# Patient Record
Sex: FEMALE | Race: WHITE | NOT HISPANIC OR LATINO | Employment: FULL TIME | URBAN - METROPOLITAN AREA
[De-identification: names, ages, dates, MRNs, and addresses within clinical notes are randomized per-mention and may not be internally consistent; named-entity substitution may affect disease eponyms.]

---

## 2020-09-17 ENCOUNTER — OFFICE VISIT (OUTPATIENT)
Dept: FAMILY MEDICINE CLINIC | Facility: CLINIC | Age: 40
End: 2020-09-17
Payer: COMMERCIAL

## 2020-09-17 VITALS
HEIGHT: 70 IN | DIASTOLIC BLOOD PRESSURE: 84 MMHG | HEART RATE: 80 BPM | SYSTOLIC BLOOD PRESSURE: 138 MMHG | WEIGHT: 223 LBS | RESPIRATION RATE: 18 BRPM | TEMPERATURE: 98.4 F | BODY MASS INDEX: 31.92 KG/M2 | OXYGEN SATURATION: 98 %

## 2020-09-17 DIAGNOSIS — J01.00 SUBACUTE MAXILLARY SINUSITIS: Primary | ICD-10-CM

## 2020-09-17 DIAGNOSIS — R09.82 PND (POST-NASAL DRIP): ICD-10-CM

## 2020-09-17 PROCEDURE — 3725F SCREEN DEPRESSION PERFORMED: CPT | Performed by: FAMILY MEDICINE

## 2020-09-17 PROCEDURE — 99213 OFFICE O/P EST LOW 20 MIN: CPT | Performed by: FAMILY MEDICINE

## 2020-09-17 PROCEDURE — 1036F TOBACCO NON-USER: CPT | Performed by: FAMILY MEDICINE

## 2020-09-17 RX ORDER — AMOXICILLIN 875 MG/1
875 TABLET, COATED ORAL 2 TIMES DAILY
Qty: 20 TABLET | Refills: 0 | Status: SHIPPED | OUTPATIENT
Start: 2020-09-17 | End: 2020-09-27

## 2020-09-17 RX ORDER — PREDNISONE 10 MG/1
10 TABLET ORAL 2 TIMES DAILY WITH MEALS
Qty: 8 TABLET | Refills: 0 | Status: SHIPPED | OUTPATIENT
Start: 2020-09-17 | End: 2020-09-21

## 2020-09-17 NOTE — PROGRESS NOTES
Assessment/Plan:  Sinusitis  Will treat  Dosing and all possible se of the rx medications were discussed and all questions were answered  Pt verbalized agreement and understanding of the plan of care as outlined during his OV  No problem-specific Assessment & Plan notes found for this encounter  Problem List Items Addressed This Visit     None            Subjective:      Patient ID: Jimena Lee is a 38 y o  male  Starting about 2 weeks ago with sinus congestion, and sneezing  She is prone to sinus infections ans sinusitis  She denies taking any medication for this [id number]  The following portions of the patient's history were reviewed and updated as appropriate: allergies, current medications, past family history, past medical history, past social history, past surgical history and problem list     Review of Systems   Constitutional: Negative for appetite change and fever  HENT: Positive for congestion, postnasal drip, rhinorrhea, sinus pressure, sinus pain and sneezing  Negative for trouble swallowing  Respiratory: Negative for shortness of breath  Cardiovascular: Negative for chest pain  Gastrointestinal: Negative for abdominal pain  Genitourinary: Negative for difficulty urinating  Musculoskeletal: Negative for myalgias  Neurological: Negative for dizziness  Psychiatric/Behavioral: The patient is not nervous/anxious  Objective:      Resp 18   Ht 5' 10" (1 778 m)   Wt 101 kg (223 lb)   BMI 32 00 kg/m²          Physical Exam  Vitals signs and nursing note reviewed  Constitutional:       General: He is not in acute distress  Appearance: He is well-developed  He is obese  HENT:      Head: Normocephalic  Right Ear: Tenderness present  A middle ear effusion is present  Left Ear: Tenderness present  A middle ear effusion is present  Nose:      Right Sinus: Frontal sinus tenderness present  Left Sinus: Frontal sinus tenderness present  Mouth/Throat:      Mouth: Mucous membranes are moist       Pharynx: Oropharynx is clear  Eyes:      Pupils: Pupils are equal, round, and reactive to light  Neck:      Musculoskeletal: Normal range of motion  Cardiovascular:      Rate and Rhythm: Normal rate and regular rhythm  Pulmonary:      Effort: Pulmonary effort is normal       Breath sounds: Normal breath sounds  Abdominal:      Palpations: Abdomen is soft  Musculoskeletal: Normal range of motion  Skin:     General: Skin is warm and dry  Neurological:      Mental Status: He is alert and oriented to person, place, and time  Psychiatric:         Behavior: Behavior normal          Thought Content:  Thought content normal          Judgment: Judgment normal

## 2020-10-09 ENCOUNTER — TELEPHONE (OUTPATIENT)
Dept: FAMILY MEDICINE CLINIC | Facility: MEDICAL CENTER | Age: 40
End: 2020-10-09

## 2021-01-07 ENCOUNTER — OFFICE VISIT (OUTPATIENT)
Dept: FAMILY MEDICINE CLINIC | Facility: MEDICAL CENTER | Age: 41
End: 2021-01-07
Payer: COMMERCIAL

## 2021-01-07 VITALS
BODY MASS INDEX: 35.07 KG/M2 | HEART RATE: 64 BPM | SYSTOLIC BLOOD PRESSURE: 118 MMHG | DIASTOLIC BLOOD PRESSURE: 84 MMHG | HEIGHT: 69 IN | WEIGHT: 236.8 LBS | TEMPERATURE: 98 F

## 2021-01-07 DIAGNOSIS — Z13.0 SCREENING FOR DISORDER OF BLOOD AND BLOOD-FORMING ORGANS: ICD-10-CM

## 2021-01-07 DIAGNOSIS — Z12.31 OTHER SCREENING MAMMOGRAM: ICD-10-CM

## 2021-01-07 DIAGNOSIS — Z00.00 ROUTINE ADULT HEALTH MAINTENANCE: ICD-10-CM

## 2021-01-07 DIAGNOSIS — E66.3 OVERWEIGHT: ICD-10-CM

## 2021-01-07 DIAGNOSIS — Z23 NEED FOR IMMUNIZATION AGAINST INFLUENZA: Primary | ICD-10-CM

## 2021-01-07 DIAGNOSIS — Z13.29 SCREENING FOR THYROID DISORDER: ICD-10-CM

## 2021-01-07 DIAGNOSIS — Z13.220 SCREENING FOR LIPID DISORDERS: ICD-10-CM

## 2021-01-07 PROCEDURE — 90471 IMMUNIZATION ADMIN: CPT | Performed by: FAMILY MEDICINE

## 2021-01-07 PROCEDURE — 3008F BODY MASS INDEX DOCD: CPT | Performed by: FAMILY MEDICINE

## 2021-01-07 PROCEDURE — 3725F SCREEN DEPRESSION PERFORMED: CPT | Performed by: FAMILY MEDICINE

## 2021-01-07 PROCEDURE — 99396 PREV VISIT EST AGE 40-64: CPT | Performed by: FAMILY MEDICINE

## 2021-01-07 PROCEDURE — 90686 IIV4 VACC NO PRSV 0.5 ML IM: CPT | Performed by: FAMILY MEDICINE

## 2021-01-07 PROCEDURE — 1036F TOBACCO NON-USER: CPT | Performed by: FAMILY MEDICINE

## 2021-01-08 NOTE — PROGRESS NOTES
Elan Amaya is a new patient for CPX  Partner of [employer]   She has a history of allergies  Tried Advil Cold and sinus  Doesn't llike nasal spray  She is prone to sinusitis  SH: Works as  for [employer]  Works from home  Full time  Lives with girlfriend  FH: MGM diabetes  No heart attacks or stroke  Cancer lung cancer, throat cancer  Colon cancer MGP's   Breast cancer GM  No osteoporosis   HH: Exercise not much  Admits her diet could be better  Dietary calcium not much  Caffeine 2 cups  Alcohol occ  Nonsmoker  Gyn none recently  Mammogram none  Colonoscopy none  Review of Systems   Constitutional: Negative for fatigue, fever and unexpected weight change  Respiratory: Negative for chest tightness, shortness of breath and wheezing  Cardiovascular: Negative for chest pain, palpitations and leg swelling  Skin: Negative for rash  Neurological: Negative for dizziness and headaches  Assessment  1  Health maintenance-needs cervical, breast, and colorectal cancer screening due to family history  Immunizations are up-to-date  Needs mammogram and  blood work  2  Obesity-discussed diet and exercise  BMI Counseling: Body mass index is 34 97 kg/m²  The BMI is above normal  Nutrition recommendations include consuming healthier snacks and moderation in carbohydrate intake  Exercise recommendations include vigorous aerobic physical activity for 75 minutes/week  Plan  Flu shot  Mammogram   Blood work    Gyn referral

## 2021-01-15 ENCOUNTER — LAB (OUTPATIENT)
Dept: LAB | Facility: MEDICAL CENTER | Age: 41
End: 2021-01-15
Payer: COMMERCIAL

## 2021-01-15 DIAGNOSIS — Z13.29 SCREENING FOR THYROID DISORDER: ICD-10-CM

## 2021-01-15 DIAGNOSIS — Z13.220 SCREENING FOR LIPID DISORDERS: ICD-10-CM

## 2021-01-15 DIAGNOSIS — Z13.0 SCREENING FOR DISORDER OF BLOOD AND BLOOD-FORMING ORGANS: ICD-10-CM

## 2021-01-15 LAB
25(OH)D3 SERPL-MCNC: 11.6 NG/ML (ref 30–100)
ALBUMIN SERPL BCP-MCNC: 3.7 G/DL (ref 3.5–5)
ALP SERPL-CCNC: 80 U/L (ref 46–116)
ALT SERPL W P-5'-P-CCNC: 24 U/L (ref 12–78)
ANION GAP SERPL CALCULATED.3IONS-SCNC: 3 MMOL/L (ref 4–13)
AST SERPL W P-5'-P-CCNC: 15 U/L (ref 5–45)
BASOPHILS # BLD AUTO: 0.05 THOUSANDS/ΜL (ref 0–0.1)
BASOPHILS NFR BLD AUTO: 1 % (ref 0–1)
BILIRUB SERPL-MCNC: 0.81 MG/DL (ref 0.2–1)
BUN SERPL-MCNC: 16 MG/DL (ref 5–25)
CALCIUM SERPL-MCNC: 9.1 MG/DL (ref 8.3–10.1)
CHLORIDE SERPL-SCNC: 107 MMOL/L (ref 100–108)
CHOLEST SERPL-MCNC: 168 MG/DL (ref 50–200)
CO2 SERPL-SCNC: 27 MMOL/L (ref 21–32)
CREAT SERPL-MCNC: 0.93 MG/DL (ref 0.6–1.3)
EOSINOPHIL # BLD AUTO: 0.14 THOUSAND/ΜL (ref 0–0.61)
EOSINOPHIL NFR BLD AUTO: 2 % (ref 0–6)
ERYTHROCYTE [DISTWIDTH] IN BLOOD BY AUTOMATED COUNT: 12.5 % (ref 11.6–15.1)
GFR SERPL CREATININE-BSD FRML MDRD: 77 ML/MIN/1.73SQ M
GLUCOSE P FAST SERPL-MCNC: 84 MG/DL (ref 65–99)
HCT VFR BLD AUTO: 40.1 % (ref 34.8–46.1)
HDLC SERPL-MCNC: 63 MG/DL
HGB BLD-MCNC: 13.1 G/DL (ref 11.5–15.4)
IMM GRANULOCYTES # BLD AUTO: 0.01 THOUSAND/UL (ref 0–0.2)
IMM GRANULOCYTES NFR BLD AUTO: 0 % (ref 0–2)
LDLC SERPL CALC-MCNC: 94 MG/DL (ref 0–100)
LYMPHOCYTES # BLD AUTO: 2.79 THOUSANDS/ΜL (ref 0.6–4.47)
LYMPHOCYTES NFR BLD AUTO: 40 % (ref 14–44)
MCH RBC QN AUTO: 31.7 PG (ref 26.8–34.3)
MCHC RBC AUTO-ENTMCNC: 32.7 G/DL (ref 31.4–37.4)
MCV RBC AUTO: 97 FL (ref 82–98)
MONOCYTES # BLD AUTO: 0.44 THOUSAND/ΜL (ref 0.17–1.22)
MONOCYTES NFR BLD AUTO: 6 % (ref 4–12)
NEUTROPHILS # BLD AUTO: 3.61 THOUSANDS/ΜL (ref 1.85–7.62)
NEUTS SEG NFR BLD AUTO: 51 % (ref 43–75)
NONHDLC SERPL-MCNC: 105 MG/DL
NRBC BLD AUTO-RTO: 0 /100 WBCS
PLATELET # BLD AUTO: 317 THOUSANDS/UL (ref 149–390)
PMV BLD AUTO: 10.6 FL (ref 8.9–12.7)
POTASSIUM SERPL-SCNC: 4.1 MMOL/L (ref 3.5–5.3)
PROT SERPL-MCNC: 7.1 G/DL (ref 6.4–8.2)
RBC # BLD AUTO: 4.13 MILLION/UL (ref 3.81–5.12)
SODIUM SERPL-SCNC: 137 MMOL/L (ref 136–145)
TRIGL SERPL-MCNC: 55 MG/DL
TSH SERPL DL<=0.05 MIU/L-ACNC: 3.72 UIU/ML (ref 0.36–3.74)
WBC # BLD AUTO: 7.04 THOUSAND/UL (ref 4.31–10.16)

## 2021-01-15 PROCEDURE — 85025 COMPLETE CBC W/AUTO DIFF WBC: CPT

## 2021-01-15 PROCEDURE — 82306 VITAMIN D 25 HYDROXY: CPT

## 2021-01-15 PROCEDURE — 36415 COLL VENOUS BLD VENIPUNCTURE: CPT

## 2021-01-15 PROCEDURE — 80053 COMPREHEN METABOLIC PANEL: CPT

## 2021-01-15 PROCEDURE — 80061 LIPID PANEL: CPT

## 2021-01-15 PROCEDURE — 84443 ASSAY THYROID STIM HORMONE: CPT

## 2021-01-28 DIAGNOSIS — E55.9 VITAMIN D DEFICIENCY: Primary | ICD-10-CM

## 2021-01-28 RX ORDER — ERGOCALCIFEROL 1.25 MG/1
50000 CAPSULE ORAL WEEKLY
Qty: 12 CAPSULE | Refills: 0 | Status: SHIPPED | OUTPATIENT
Start: 2021-01-28

## 2021-03-30 ENCOUNTER — OFFICE VISIT (OUTPATIENT)
Dept: OBGYN CLINIC | Facility: MEDICAL CENTER | Age: 41
End: 2021-03-30
Payer: COMMERCIAL

## 2021-03-30 ENCOUNTER — HOSPITAL ENCOUNTER (OUTPATIENT)
Dept: RADIOLOGY | Facility: MEDICAL CENTER | Age: 41
Discharge: HOME | End: 2021-03-30
Payer: COMMERCIAL

## 2021-03-30 VITALS — BODY MASS INDEX: 33.77 KG/M2 | HEIGHT: 69 IN | WEIGHT: 228 LBS

## 2021-03-30 VITALS
DIASTOLIC BLOOD PRESSURE: 86 MMHG | HEIGHT: 69 IN | WEIGHT: 228.6 LBS | BODY MASS INDEX: 33.86 KG/M2 | SYSTOLIC BLOOD PRESSURE: 136 MMHG

## 2021-03-30 DIAGNOSIS — Z12.31 VISIT FOR SCREENING MAMMOGRAM: ICD-10-CM

## 2021-03-30 DIAGNOSIS — Z12.31 OTHER SCREENING MAMMOGRAM: ICD-10-CM

## 2021-03-30 DIAGNOSIS — Z01.419 ENCOUNTER FOR GYNECOLOGICAL EXAMINATION (GENERAL) (ROUTINE) WITHOUT ABNORMAL FINDINGS: Primary | ICD-10-CM

## 2021-03-30 PROCEDURE — 87624 HPV HI-RISK TYP POOLED RSLT: CPT | Performed by: STUDENT IN AN ORGANIZED HEALTH CARE EDUCATION/TRAINING PROGRAM

## 2021-03-30 PROCEDURE — 3008F BODY MASS INDEX DOCD: CPT | Performed by: FAMILY MEDICINE

## 2021-03-30 PROCEDURE — 99386 PREV VISIT NEW AGE 40-64: CPT | Performed by: STUDENT IN AN ORGANIZED HEALTH CARE EDUCATION/TRAINING PROGRAM

## 2021-03-30 PROCEDURE — 77063 BREAST TOMOSYNTHESIS BI: CPT

## 2021-03-30 PROCEDURE — G0145 SCR C/V CYTO,THINLAYER,RESCR: HCPCS | Performed by: STUDENT IN AN ORGANIZED HEALTH CARE EDUCATION/TRAINING PROGRAM

## 2021-03-30 PROCEDURE — 77067 SCR MAMMO BI INCL CAD: CPT

## 2021-03-30 NOTE — PROGRESS NOTES
Assessment/Plan:    39 yo G0 annual exam  Doing well  Problem List Items Addressed This Visit    Visit Diagnoses     Encounter for gynecological examination (general) (routine) without abnormal findings    -  Primary    Liquid-based pap, screening  Mammo today  Discussed early colonoscopy with PCP + insurance company due to family history - insurance won't cover  Encouraged to report any change in bowel movements  Encourage healthy diet + exercise  F/uin 1 yr or PRN            Subjective:      Patient ID: Meryle Mariscal is a 38 y o  female  This is a 38 y o  G0 w/ last menstrual period of 03/15/2021 (exact date) in same sex relationship  She has regular periods of normal amount and duration  No bowel or bladder issues  She is sexually active w/o problems and declines STD testing today  Screening:  Last pap smear: Oct 2012 negative - No h/o abnormal  Last mammo: never - scheduled this afternoon    Family history: Maternal grandmother - breast cancer  Both maternal grandparents + paternal grandfather - colon cancer    Does not have regular exercise schedule and admits to not always eating a healthy diet - aware she could improve  The following portions of the patient's history were reviewed and updated as appropriate: allergies, current medications, past family history, past medical history, past social history, past surgical history and problem list     Review of Systems   Constitutional: Negative  HENT: Negative  Eyes: Negative  Respiratory: Negative  Cardiovascular: Negative  Gastrointestinal: Negative  Endocrine: Negative  Genitourinary: Negative for dyspareunia, dysuria, frequency, menstrual problem, pelvic pain, vaginal discharge and vaginal pain  Musculoskeletal: Negative  Skin: Negative  Allergic/Immunologic: Negative  Neurological: Negative  Hematological: Negative  Psychiatric/Behavioral: Negative            Objective:      /86 (BP Location: Left arm, Patient Position: Sitting, Cuff Size: Standard)   Ht 5' 9" (1 753 m)   Wt 104 kg (228 lb 9 6 oz)   LMP 03/15/2021 (Exact Date)   BMI 33 76 kg/m²          Physical Exam  Vitals signs reviewed  Exam conducted with a chaperone present  Neck:      Musculoskeletal: Normal range of motion  Cardiovascular:      Rate and Rhythm: Normal rate  Pulmonary:      Effort: Pulmonary effort is normal    Chest:      Breasts: Breasts are symmetrical          Right: No mass, nipple discharge, skin change or tenderness  Left: No mass, nipple discharge, skin change or tenderness  Abdominal:      Palpations: Abdomen is soft  Genitourinary:     Labia:         Right: No rash  Left: No rash  Vagina: Normal  No signs of injury  Cervix: Discharge present  No cervical motion tenderness or friability  Uterus: Not enlarged and not fixed  Adnexa:         Right: No mass  Left: No mass  Skin:     General: Skin is warm and dry  Neurological:      Mental Status: She is alert and oriented to person, place, and time     Psychiatric:         Behavior: Behavior normal

## 2021-03-31 LAB
HPV HR 12 DNA CVX QL NAA+PROBE: NEGATIVE
HPV16 DNA CVX QL NAA+PROBE: NEGATIVE
HPV18 DNA CVX QL NAA+PROBE: NEGATIVE

## 2021-04-08 LAB
LAB AP GYN PRIMARY INTERPRETATION: NORMAL
Lab: NORMAL

## 2021-04-13 DIAGNOSIS — Z23 ENCOUNTER FOR IMMUNIZATION: ICD-10-CM

## 2021-04-29 ENCOUNTER — IMMUNIZATIONS (OUTPATIENT)
Dept: FAMILY MEDICINE CLINIC | Facility: HOSPITAL | Age: 41
End: 2021-04-29

## 2021-04-29 DIAGNOSIS — Z23 ENCOUNTER FOR IMMUNIZATION: Primary | ICD-10-CM

## 2021-04-29 PROCEDURE — 91300 SARS-COV-2 / COVID-19 MRNA VACCINE (PFIZER-BIONTECH) 30 MCG: CPT | Performed by: INTERNAL MEDICINE

## 2021-04-29 PROCEDURE — 0001A SARS-COV-2 / COVID-19 MRNA VACCINE (PFIZER-BIONTECH) 30 MCG: CPT | Performed by: INTERNAL MEDICINE

## 2021-05-22 ENCOUNTER — IMMUNIZATIONS (OUTPATIENT)
Dept: FAMILY MEDICINE CLINIC | Facility: HOSPITAL | Age: 41
End: 2021-05-22

## 2021-05-22 DIAGNOSIS — Z23 ENCOUNTER FOR IMMUNIZATION: Primary | ICD-10-CM

## 2021-05-22 PROCEDURE — 91300 SARS-COV-2 / COVID-19 MRNA VACCINE (PFIZER-BIONTECH) 30 MCG: CPT

## 2021-05-22 PROCEDURE — 0002A SARS-COV-2 / COVID-19 MRNA VACCINE (PFIZER-BIONTECH) 30 MCG: CPT

## 2021-08-19 ENCOUNTER — OFFICE VISIT (OUTPATIENT)
Dept: URGENT CARE | Facility: MEDICAL CENTER | Age: 41
End: 2021-08-19
Payer: COMMERCIAL

## 2021-08-19 ENCOUNTER — TELEPHONE (OUTPATIENT)
Dept: FAMILY MEDICINE CLINIC | Facility: MEDICAL CENTER | Age: 41
End: 2021-08-19

## 2021-08-19 VITALS — HEIGHT: 69 IN | BODY MASS INDEX: 33.77 KG/M2 | WEIGHT: 228 LBS

## 2021-08-19 DIAGNOSIS — J02.9 ACUTE PHARYNGITIS, UNSPECIFIED ETIOLOGY: Primary | ICD-10-CM

## 2021-08-19 DIAGNOSIS — K12.1 STOMATITIS: ICD-10-CM

## 2021-08-19 LAB — S PYO AG THROAT QL: NEGATIVE

## 2021-08-19 PROCEDURE — 87070 CULTURE OTHR SPECIMN AEROBIC: CPT | Performed by: PHYSICIAN ASSISTANT

## 2021-08-19 PROCEDURE — 99213 OFFICE O/P EST LOW 20 MIN: CPT | Performed by: PHYSICIAN ASSISTANT

## 2021-08-19 PROCEDURE — 87880 STREP A ASSAY W/OPTIC: CPT | Performed by: PHYSICIAN ASSISTANT

## 2021-08-19 NOTE — PROGRESS NOTES
[facility]        NAME: Too Nash is a 42 y o  female  : 1980    MRN: [id number]  DATE: 2021  TIME: 6:56 PM    Assessment and Plan   Acute pharyngitis, unspecified etiology [J02 9]  1  Acute pharyngitis, unspecified etiology  POCT rapid strepA   2  Stomatitis  al mag oxide-diphenhydramine-lidocaine viscous (MAGIC MOUTHWASH) 1:1:1 suspension         Patient Instructions     1  Motrin as needed for mouth pain/fever  2  Use Magic Mouthwash- swish and spit every 6-8 hours as needed for comfort  3  Follow up with PCP in 3-5 days if symptoms persist       Chief Complaint     Chief Complaint   Patient presents with    Sore Throat     since tuesday, patient is vaccinated     Headache         History of Present Illness         Adeola Le is a 41-year-old female presents with a 2 day history of headache, sore throat and painful lesions on the roof of her mouth  Patient denies any chills, body aches, vomiting or diarrhea since the onset of her symptoms  She did reports increased nasal congestion and slight runny nose that started earlier today  Patient denies any known sick contacts  Review of Systems   Review of Systems   Constitutional: Negative  HENT: Positive for congestion, mouth sores and sore throat  Respiratory: Negative  Gastrointestinal: Negative            Current Medications       Current Outpatient Medications:     al mag oxide-diphenhydramine-lidocaine viscous (MAGIC MOUTHWASH) 1:1:1 suspension, Swish and spit 10 mL every 6 (six) hours as needed for mouth pain or discomfort for up to 3 days, Disp: 90 mL, Rfl: 0    ergocalciferol (VITAMIN D2) 50,000 units, Take 1 capsule (50,000 Units total) by mouth once a week (Patient not taking: Reported on 2021), Disp: 12 capsule, Rfl: 0    Current Allergies     Allergies as of 2021    (No Known Allergies)            The following portions of the patient's history were reviewed and updated as appropriate: allergies, current medications, past family history, past medical history, past social history, past surgical history and problem list      Past Medical History:   Diagnosis Date    Allergic     Anxiety disorder        History reviewed  No pertinent surgical history  Family History   Problem Relation Age of Onset    Cancer Maternal Grandmother     Diabetes Maternal Grandmother     Breast cancer Maternal Grandmother         over 48    Colon cancer Maternal Grandmother         over 48    Cancer Maternal Grandfather     Colon cancer Maternal Grandfather         over 48    Cancer Paternal Grandfather     Colon cancer Paternal Grandfather         over 48    No Known Problems Mother     No Known Problems Father     No Known Problems Sister     No Known Problems Brother     No Known Problems Maternal Aunt     No Known Problems Paternal Uncle     No Known Problems Paternal Uncle     No Known Problems Maternal Aunt     No Known Problems Maternal Aunt     Prostate cancer Maternal Uncle          Medications have been verified  Objective   Ht 5' 9" (1 753 m)   Wt 103 kg (228 lb)   BMI 33 67 kg/m²   No LMP recorded  Physical Exam     Physical Exam  Constitutional:       Appearance: She is well-developed  She is not ill-appearing  HENT:      Head: Normocephalic and atraumatic  Right Ear: Tympanic membrane and ear canal normal       Left Ear: Tympanic membrane and ear canal normal       Nose: Congestion and rhinorrhea present  Rhinorrhea is clear  Mouth/Throat:      Lips: Pink  Pharynx: Posterior oropharyngeal erythema present  Neurological:      Mental Status: She is alert

## 2021-08-19 NOTE — TELEPHONE ENCOUNTER
Pt called to request appt with Dr Ahsan Lombardo  She has had a sore throat all week and has not been able to eat for 3 days  Someone looked at her throat and said the roof of her mouth is "covered with sores and pus "  She denied fever, cough, nausea and vomiting, but does have a runny nose and had a headache earlier in the week  Routed to Clinical - covid testing?

## 2021-08-19 NOTE — PATIENT INSTRUCTIONS
1  Motrin as needed for mouth pain/fever  2  Use Magic Mouthwash- swish and spit every 6-8 hours as needed for comfort  3   Follow up with PCP in 3-5 days if symptoms persist

## 2021-08-19 NOTE — TELEPHONE ENCOUNTER
Triaged-Triaged- aware covid testing would be advised first, since she is in discomfort I suggest she go to [facility] and be assessed, they can test her there if warranted    Patient understands instructions

## 2021-08-21 LAB — BACTERIA THROAT CULT: NORMAL

## 2021-12-21 ENCOUNTER — IMMUNIZATIONS (OUTPATIENT)
Dept: FAMILY MEDICINE CLINIC | Facility: HOSPITAL | Age: 41
End: 2021-12-21

## 2021-12-21 DIAGNOSIS — Z23 ENCOUNTER FOR IMMUNIZATION: Primary | ICD-10-CM

## 2021-12-21 PROCEDURE — 0001A COVID-19 PFIZER VACC 0.3 ML: CPT | Performed by: PEDIATRICS

## 2021-12-21 PROCEDURE — 91300 COVID-19 PFIZER VACC 0.3 ML: CPT | Performed by: PEDIATRICS

## 2022-01-13 ENCOUNTER — OFFICE VISIT (OUTPATIENT)
Dept: FAMILY MEDICINE CLINIC | Facility: MEDICAL CENTER | Age: 42
End: 2022-01-13
Payer: COMMERCIAL

## 2022-01-13 VITALS
TEMPERATURE: 98.6 F | HEIGHT: 69 IN | SYSTOLIC BLOOD PRESSURE: 110 MMHG | DIASTOLIC BLOOD PRESSURE: 64 MMHG | WEIGHT: 216.8 LBS | BODY MASS INDEX: 32.11 KG/M2 | OXYGEN SATURATION: 98 % | HEART RATE: 72 BPM

## 2022-01-13 DIAGNOSIS — E66.3 OVERWEIGHT: ICD-10-CM

## 2022-01-13 DIAGNOSIS — E55.9 VITAMIN D DEFICIENCY: ICD-10-CM

## 2022-01-13 DIAGNOSIS — Z00.00 ROUTINE ADULT HEALTH MAINTENANCE: ICD-10-CM

## 2022-01-13 DIAGNOSIS — Z23 IMMUNIZATION DUE: Primary | ICD-10-CM

## 2022-01-13 DIAGNOSIS — Z12.31 ENCOUNTER FOR SCREENING MAMMOGRAM FOR MALIGNANT NEOPLASM OF BREAST: ICD-10-CM

## 2022-01-13 PROCEDURE — 99396 PREV VISIT EST AGE 40-64: CPT | Performed by: FAMILY MEDICINE

## 2022-01-13 PROCEDURE — 90686 IIV4 VACC NO PRSV 0.5 ML IM: CPT

## 2022-01-13 PROCEDURE — 3008F BODY MASS INDEX DOCD: CPT | Performed by: FAMILY MEDICINE

## 2022-01-13 PROCEDURE — 1036F TOBACCO NON-USER: CPT | Performed by: FAMILY MEDICINE

## 2022-01-13 PROCEDURE — 3725F SCREEN DEPRESSION PERFORMED: CPT | Performed by: FAMILY MEDICINE

## 2022-01-13 PROCEDURE — 90471 IMMUNIZATION ADMIN: CPT

## 2022-01-13 NOTE — PROGRESS NOTES
Brant Childs is here for CPX  HH: Trying to walk  Caffeine 1-2 daily  Alcohol occ  Nonsmoker  Dietary calcium not much  FH: unchanged  SH:  Works from home for the [employer]  Broke up with girlfriend recently    Mammogram 2021  Gyn exam 2021     She gets a stiffness in her neck scarlet when turning to the right  No pain radiation  No paresthesias  she was noted have significant vitamin-D deficiency  On blood work done last year  She did take the vitamin-D prescription for 3 months but has not always been compliant with the vitamin D3 2000 units daily  O: /64   Pulse 72   Temp 98 6 °F (37 °C)   Ht 5' 9" (1 753 m)   Wt 98 3 kg (216 lb 12 8 oz)   SpO2 98%   BMI 32 02 kg/m²     BP by me 110/64  Physical Exam  Constitutional:       Appearance: She is well-developed  HENT:      Head: Normocephalic  Right Ear: Tympanic membrane and external ear normal       Left Ear: Tympanic membrane and external ear normal       Nose: Nose normal    Eyes:      General: No scleral icterus  Conjunctiva/sclera: Conjunctivae normal       Pupils: Pupils are equal, round, and reactive to light  Neck:      Thyroid: No thyroid mass or thyromegaly  Vascular: No carotid bruit  Cardiovascular:      Rate and Rhythm: Normal rate and regular rhythm  Pulses:           Femoral pulses are 2+ on the right side and 2+ on the left side  Popliteal pulses are 2+ on the right side and 2+ on the left side  Dorsalis pedis pulses are 2+ on the right side and 2+ on the left side  Posterior tibial pulses are 2+ on the right side and 2+ on the left side  Heart sounds: Normal heart sounds  Pulmonary:      Effort: Pulmonary effort is normal  No respiratory distress  Breath sounds: Normal breath sounds  No wheezing or rales  Chest:   Breasts:      Right: No supraclavicular adenopathy  Left: No supraclavicular adenopathy         Abdominal:      General: Bowel sounds are normal  There is no distension  Palpations: Abdomen is soft  There is no mass  Tenderness: There is no abdominal tenderness  Musculoskeletal:         General: Normal range of motion  Cervical back: Normal range of motion and neck supple  Lymphadenopathy:      Head:      Right side of head: No submandibular or occipital adenopathy  Left side of head: No submandibular or occipital adenopathy  Cervical: No cervical adenopathy  Upper Body:      Right upper body: No supraclavicular adenopathy  Left upper body: No supraclavicular adenopathy  Skin:     Findings: No lesion or rash  Neurological:      Mental Status: She is oriented to person, place, and time  Psychiatric:         Behavior: Behavior normal        Assessment    1  Health maintenance- up-to-date with breast and cervical cancer screening  We discussed mammography recommendations  I also advised colonoscopy at age 38 in light of her family history  Advise calcium supplementation along with better compliance with vitamin-D and weight-bearing exercise to prevent osteoporosis  Immunizations are up-to-date including COVID vaccine with booster  Should have hepatitis-C antibody  2  Overweight-encouraged diet exercise   3  Vitamin-D deficiency -encouraged better compliance with vitamin-D 3 2000 units daily  4  Cervicalgia -mild -encouraged range of motion and massage     Plan   Mammogram   Caltrate  Vitamin-D supplementation  Recheck 1 year    Wishes to defer blood work until next year at which time she will also have hepatitis-C antibody

## 2022-04-05 ENCOUNTER — ANNUAL EXAM (OUTPATIENT)
Dept: OBGYN CLINIC | Facility: MEDICAL CENTER | Age: 42
End: 2022-04-05
Payer: COMMERCIAL

## 2022-04-05 VITALS — HEIGHT: 69 IN | BODY MASS INDEX: 32.94 KG/M2 | WEIGHT: 222.4 LBS

## 2022-04-05 DIAGNOSIS — Z01.419 ENCOUNTER FOR ANNUAL ROUTINE GYNECOLOGICAL EXAMINATION: Primary | ICD-10-CM

## 2022-04-05 DIAGNOSIS — E66.9 OBESITY (BMI 30-39.9): ICD-10-CM

## 2022-04-05 PROCEDURE — 99396 PREV VISIT EST AGE 40-64: CPT | Performed by: STUDENT IN AN ORGANIZED HEALTH CARE EDUCATION/TRAINING PROGRAM

## 2022-04-05 PROCEDURE — 1036F TOBACCO NON-USER: CPT | Performed by: STUDENT IN AN ORGANIZED HEALTH CARE EDUCATION/TRAINING PROGRAM

## 2022-04-05 PROCEDURE — 3008F BODY MASS INDEX DOCD: CPT | Performed by: STUDENT IN AN ORGANIZED HEALTH CARE EDUCATION/TRAINING PROGRAM

## 2022-04-05 NOTE — PROGRESS NOTES
Assessment/Plan:    41 yo G0 - annual exam   F/u in 1 yr or prn      Problem List Items Addressed This Visit     Encounter for annual routine gynecological examination    -  Primary  Pap due 2026    Obesity (BMI 30-39  9)      Declines nutrition referral            Subjective:      Patient ID:     This is a 42 y o  G0 with last menstrual period was 03/16/2022 (exact date)  Contraception: same sex relationship  She has regular periods of normal amount and duration  She is sexually active and declines STD testing today  She has no issues with intercourse  No bowel or bladder concerns  Screening:  Last pap smear: 3/30/21 neg/neg  Last mammo: 3/20/21 neg - scheduled for 4/12/22  Colonoscopy: previously discussed w/ insurance - not covered despite family history  Plans to try again  Family history:     Colon cancer: paternal and maternal grandfather, maternal grandmother   Breast cancer: maternal grandmother    BMI 32 84 - admits she could eat healthier and exercise more        The following portions of the patient's history were reviewed and updated as appropriate: allergies, current medications, past family history, past medical history, past social history, past surgical history and problem list     Review of Systems   Constitutional: Negative  HENT: Negative  Eyes: Negative  Respiratory: Negative  Cardiovascular: Negative  Gastrointestinal: Negative  Endocrine: Negative  Genitourinary: Negative for dyspareunia, dysuria, frequency, menstrual problem, pelvic pain, vaginal discharge and vaginal pain  Musculoskeletal: Negative  Skin: Negative  Allergic/Immunologic: Negative  Neurological: Negative  Hematological: Negative  Psychiatric/Behavioral: Negative  Objective:      Ht 5' 9" (1 753 m)   Wt 101 kg (222 lb 6 4 oz)   LMP 03/16/2022 (Exact Date)   BMI 32 84 kg/m²          Physical Exam  Vitals reviewed  Exam conducted with a chaperone present     Cardiovascular: Rate and Rhythm: Normal rate  Pulmonary:      Effort: Pulmonary effort is normal    Chest:   Breasts: Breasts are symmetrical       Right: No mass, nipple discharge, skin change or tenderness  Left: No mass, nipple discharge, skin change or tenderness  Abdominal:      Palpations: Abdomen is soft  Genitourinary:     Vagina: Normal  No signs of injury  Uterus: Not enlarged and not fixed  Adnexa:         Right: No mass  Left: No mass  Musculoskeletal:      Cervical back: Normal range of motion  Skin:     General: Skin is warm and dry  Neurological:      Mental Status: She is alert and oriented to person, place, and time     Psychiatric:         Behavior: Behavior normal  Patient

## 2022-04-12 ENCOUNTER — HOSPITAL ENCOUNTER (OUTPATIENT)
Dept: RADIOLOGY | Facility: MEDICAL CENTER | Age: 42
Discharge: HOME | End: 2022-04-12
Payer: COMMERCIAL

## 2022-04-12 VITALS — WEIGHT: 222 LBS | HEIGHT: 69 IN | BODY MASS INDEX: 32.88 KG/M2

## 2022-04-12 DIAGNOSIS — Z12.31 ENCOUNTER FOR SCREENING MAMMOGRAM FOR MALIGNANT NEOPLASM OF BREAST: ICD-10-CM

## 2022-04-12 PROCEDURE — 77063 BREAST TOMOSYNTHESIS BI: CPT

## 2022-04-12 PROCEDURE — 77067 SCR MAMMO BI INCL CAD: CPT

## 2022-12-23 ENCOUNTER — TELEPHONE (OUTPATIENT)
Dept: FAMILY MEDICINE CLINIC | Facility: MEDICAL CENTER | Age: 42
End: 2022-12-23

## 2022-12-23 NOTE — TELEPHONE ENCOUNTER
I spoke to [place], she said that she thinks she has a sinus infection and would like to possible get an antibiotic or an appt  Advised that we do not have appts left this afternoon but that she could go to [place] [id number] [facility] or [facility] as she wants to get an antibiotic before she flies tomorrow  Patient agreeable

## 2022-12-23 NOTE — TELEPHONE ENCOUNTER
Pt said she has a ST starting today  She had a ST last month that resolved  She is going to [facility]  tomorrow and want to know what to do  She would like abx  Please, triage

## 2023-01-24 ENCOUNTER — OFFICE VISIT (OUTPATIENT)
Dept: FAMILY MEDICINE CLINIC | Facility: MEDICAL CENTER | Age: 43
End: 2023-01-24

## 2023-01-24 VITALS
DIASTOLIC BLOOD PRESSURE: 84 MMHG | HEART RATE: 71 BPM | WEIGHT: 220.6 LBS | BODY MASS INDEX: 32.67 KG/M2 | HEIGHT: 69 IN | SYSTOLIC BLOOD PRESSURE: 130 MMHG | RESPIRATION RATE: 16 BRPM | TEMPERATURE: 97.3 F

## 2023-01-24 DIAGNOSIS — Z23 ENCOUNTER FOR IMMUNIZATION: ICD-10-CM

## 2023-01-24 DIAGNOSIS — Z13.1 SCREENING FOR DIABETES MELLITUS (DM): ICD-10-CM

## 2023-01-24 DIAGNOSIS — B00.1 RECURRENT COLD SORES: ICD-10-CM

## 2023-01-24 DIAGNOSIS — K59.00 CONSTIPATION, UNSPECIFIED CONSTIPATION TYPE: ICD-10-CM

## 2023-01-24 DIAGNOSIS — Z12.31 ENCOUNTER FOR SCREENING MAMMOGRAM FOR MALIGNANT NEOPLASM OF BREAST: ICD-10-CM

## 2023-01-24 DIAGNOSIS — K92.1 BLOOD IN STOOL: ICD-10-CM

## 2023-01-24 DIAGNOSIS — L70.9 ACNE, UNSPECIFIED ACNE TYPE: ICD-10-CM

## 2023-01-24 DIAGNOSIS — Z11.4 SCREENING FOR HIV (HUMAN IMMUNODEFICIENCY VIRUS): ICD-10-CM

## 2023-01-24 DIAGNOSIS — R45.89 DEPRESSED MOOD: ICD-10-CM

## 2023-01-24 DIAGNOSIS — Z11.59 NEED FOR HEPATITIS C SCREENING TEST: ICD-10-CM

## 2023-01-24 DIAGNOSIS — Z80.0 FAMILY HISTORY OF COLON CANCER: ICD-10-CM

## 2023-01-24 DIAGNOSIS — Z00.00 ANNUAL PHYSICAL EXAM: Primary | ICD-10-CM

## 2023-01-24 DIAGNOSIS — Z13.220 SCREENING CHOLESTEROL LEVEL: ICD-10-CM

## 2023-01-24 RX ORDER — VALACYCLOVIR HYDROCHLORIDE 1 G/1
2000 TABLET, FILM COATED ORAL 2 TIMES DAILY
Qty: 32 TABLET | Refills: 0 | Status: SHIPPED | OUTPATIENT
Start: 2023-01-24 | End: 2023-01-25

## 2023-01-24 NOTE — PROGRESS NOTES
[facility] HEALTH MAINTENANCE OFFICE VISIT  [facility] - [facility] [place]    NAME: Raphael Kumar  AGE: 36 y o  SEX: female  : 1980     DATE: 2023    Assessment and Plan     1  Annual physical exam    2  Encounter for immunization  -     influenza vaccine, quadrivalent, 0 5 mL, preservative-free, for adult and pediatric patients 6 mos+ (AFLURIA, FLUARIX, FLULAVAL, FLUZONE)    3  Need for hepatitis C screening test  -     Hepatitis C Antibody ([facility]); Future    4  Screening for HIV (human immunodeficiency virus)  -     HIV 1/2 AG/AB w Reflex [facility] for 2 yr old and above; Future    5  Depressed mood  -     Ambulatory Referral to [facility]; Future    6  Constipation, unspecified constipation type  -     psyllium (METAMUCIL) 58 6 % packet; Take 1 packet by mouth daily  -     TSH, 3rd generation with Free T4 reflex; Future    7  Acne, unspecified acne type  -     Ambulatory Referral to Dermatology; Future    8  Encounter for screening mammogram for malignant neoplasm of breast  -     Mammo screening bilateral w 3d & cad; Future; Expected date: 2023    9  BMI 32 0-32 9,adult  -     Lipid Panel with Direct LDL reflex; Future  -     Hemoglobin A1C; Future    10  Screening for diabetes mellitus (DM)  -     Hemoglobin A1C; Future    11  Screening cholesterol level  -     Lipid Panel with Direct LDL reflex; Future    12  Family history of colon cancer  -     Ambulatory Referral to Gastroenterology; Future    13  Recurrent cold sores  -     valACYclovir (VALTREX) 1,000 mg tablet; Take 2 tablets (2,000 mg total) by mouth 2 (two) times a day for 1 day Take 2000 mg twice a day for 1 day ASAP after symptom onset of cold sore  - If more recurrent in frequency can consider switching to suppression dosing    14   Blood in stool        - Patient does mention episodes of blood in stool, patient declined rectal exam today, will follow-up labs per orders, also advised about consultation with Gastroenterology, nonacute abdomen on exam today and vital signs stable        - Possibly related to hemorrhoid(s), patient does struggle with constipation- has bowel movement 2 or 3 times a  week, advised about measures to support regular bowel movements including adequate hydration, physical activity,  and adequate fiber intake, also advised to start Metamucil daily  -     Comprehensive metabolic panel; Future  -     CBC and differential; Future  -     Calprotectin,Fecal; Future  -     Occult Bloood,Fecal Immunochemical; Future  -     Ambulatory Referral to Gastroenterology; Future    · Patient Counseling:   · Nutrition: Stressed importance of a well balanced diet, moderation of sodium/saturated fat, caloric balance and sufficient intake of fiber  · Exercise: Stressed the importance of regular exercise with a goal of 150 minutes per week  · Dental Health: Discussed daily flossing and brushing and regular dental visits   · Alcohol Use:  Recommended moderation of alcohol intake  · Injury Prevention: Discussed Safety Belts, Safety Helmets, and Smoke Detectors  · Immunizations reviewed: Risks and Benefits discussed   · Influenza vaccine will receive today  · Tetanus booster up-to-date  · Counseled about COVID-19 bivalent vaccine  · Discussed benefits of:  Mammogram , Cervical Cancer screening and Screening labs  · 4/8/2021 Pap smear with cotesting normal cytology and negative HPV  · Prescription provided for mammogram in April  · Referral provided to gastroenterology, family history of colon cancer, also with complaint of blood in stool  ·   BMI Counseling: Body mass index is 32 58 kg/m²  Discussed with patient's BMI with her  BMI Counseling: Body mass index is 32 58 kg/m²  The BMI is above normal  Nutrition recommendations include encouraging healthy choices of fruits and vegetables  Exercise recommendations include exercising 3-5 times per week   Rationale for BMI follow-up plan is due to patient being overweight or obese  Depression Screening and Follow-up Plan: Patient was screened for depression during today's encounter  They screened negative with a PHQ-2 score of 2  Chief Complaint     Chief Complaint   Patient presents with   • Annual Exam   • Rectal Bleeding       History of Present Illness     HPI    Well Adult Physical     Patient here for a comprehensive physical exam     Mentions recurrent cold sores, especially winter  Would like a more effective treatment option  Patient also mentions blood in stool  Since April 2022, has had a number of episodes  Saw stool streaked with blood, bright red  Denies any abdominal pain  Does endorse constipation, will have a bowel movement 2-3 times per week  States this has been an issue since childhood  Offers that could hydrate better  Also can improve fiber in diet  Patient also request referral to counseling services for depressed mood  Not interested in pharmacotherapy at this point in time  Patient also requests referral to Dermatology as patient states gets "breakouts on face and back"   Also mentions a few episodes of urinary incontinence  No dysuria  No fever, no chills  Advised about Kegel exercises and to monitor  Declines UA      Diet and Physical Activity  Diet: could improve with fruits and vegetables   Exercise: couple times a week walks dog      Depression Screen  PHQ-2/9 Depression Screening    Little interest or pleasure in doing things: 1 - several days  Feeling down, depressed, or hopeless: 1 - several days  PHQ-2 Score: 2  PHQ-2 Interpretation: Negative depression screen          General Health  Hearing: Normal:  bilateral  Vision: goes for regular eye exams and wears glasses and contacts  Dental: regular dental visits, brushes teeth twice daily and flosses teeth occasionally    Reproductive Health  Established with gynecology for well woman care      The following portions of the patient's history were reviewed and updated as appropriate: allergies, current medications, past family history, past medical history, past social history, past surgical history and problem list     Review of Systems     Review of Systems     As noted in HPI     Past Medical History     Past Medical History:   Diagnosis Date   • Allergic    • Anxiety disorder        Past Surgical History     History reviewed  No pertinent surgical history  Social History     Social History     Socioeconomic History   • Marital status: Single     Spouse name: None   • Number of children: None   • Years of education: None   • Highest education level: None   Occupational History   • None   Tobacco Use   • Smoking status: Never   • Smokeless tobacco: Never   Vaping Use   • Vaping Use: Never used   Substance and Sexual Activity   • Alcohol use:  Yes     Alcohol/week: 0 0 standard drinks     Comment: rare use   • Drug use: Not Currently   • Sexual activity: Yes     Partners: Female     Birth control/protection: None   Other Topics Concern   • None   Social History Narrative    · Most recent tobacco use screenin2018      Social Determinants of Health     Financial Resource Strain: Not on file   Food Insecurity: Not on file   Transportation Needs: Not on file   Physical Activity: Not on file   Stress: Not on file   Social Connections: Not on file   Intimate Partner Violence: Not on file   Housing Stability: Not on file       Family History     Family History   Problem Relation Age of Onset   • Cancer Maternal Grandmother    • Diabetes Maternal Grandmother    • Breast cancer Maternal Grandmother         over 48   • Colon cancer Maternal Grandmother         over 48   • Cancer Maternal Grandfather    • Colon cancer Maternal Grandfather         over 48   • Cancer Paternal Grandfather    • Colon cancer Paternal Grandfather         over 48   • No Known Problems Mother    • No Known Problems Father    • No Known Problems Sister    • No Known Problems Brother    • No Known Problems Maternal Aunt    • No Known Problems Paternal Uncle    • No Known Problems Paternal Uncle    • No Known Problems Maternal Aunt    • No Known Problems Maternal Aunt    • Prostate cancer Maternal Uncle    • Ovarian cancer Neg Hx    • Cervical cancer Neg Hx        Current Medications       Current Outpatient Medications:   •  psyllium (METAMUCIL) 58 6 % packet, Take 1 packet by mouth daily, Disp: 75 each, Rfl: 0  •  valACYclovir (VALTREX) 1,000 mg tablet, Take 2 tablets (2,000 mg total) by mouth 2 (two) times a day for 1 day Take 2000 mg twice a day for 1 day ASAP after symptom onset of cold sore, Disp: 32 tablet, Rfl: 0     Allergies     No Known Allergies    Objective     /84 (Cuff Size: Large)   Pulse 71   Temp (!) 97 3 °F (36 3 °C)   Resp 16   Ht 5' 9" (1 753 m)   Wt 100 kg (220 lb 9 6 oz)   BMI 32 58 kg/m²      Physical Exam  Vitals reviewed  Constitutional:       General: She is not in acute distress  Appearance: She is obese  She is not ill-appearing or toxic-appearing  HENT:      Head: Normocephalic and atraumatic  Right Ear: External ear normal       Left Ear: External ear normal       Nose: Nose normal       Mouth/Throat:      Mouth: Mucous membranes are moist       Pharynx: Oropharynx is clear  Eyes:      Extraocular Movements: Extraocular movements intact  Conjunctiva/sclera: Conjunctivae normal       Pupils: Pupils are equal, round, and reactive to light  Neck:      Thyroid: No thyroid tenderness  Cardiovascular:      Rate and Rhythm: Normal rate and regular rhythm  Pulses: Normal pulses  Heart sounds: Normal heart sounds  Pulmonary:      Effort: Pulmonary effort is normal       Breath sounds: Normal breath sounds  Abdominal:      General: Abdomen is flat  Bowel sounds are normal       Palpations: Abdomen is soft  Tenderness: There is no abdominal tenderness  There is no guarding or rebound  Musculoskeletal:      Cervical back: Neck supple  Right lower leg: No edema  Left lower leg: No edema  Skin:     General: Skin is warm and dry  Capillary Refill: Capillary refill takes less than 2 seconds  Neurological:      Mental Status: She is alert and oriented to person, place, and time  Psychiatric:         Mood and Affect: Affect is flat  Behavior: Behavior normal          Thought Content:  Thought content normal          Judgment: Judgment normal                  Daniel Angeles DO  [facility] [place]

## 2023-01-24 NOTE — PATIENT INSTRUCTIONS
Kegel Exercises for Women   AMBULATORY CARE:   Kegel exercises  help strengthen your pelvic muscles  Pelvic muscles hold your pelvic organs, such as your bladder and uterus, in place  Kegel exercises help prevent or control problems with urine incontinence (leakage)  Incontinence may be caused by pregnancy, childbirth, or menopause  Contact your healthcare provider if:   · You cannot feel your muscles tighten or relax  · You continue to leak urine  · You have questions or concerns about your condition or care  Use the correct muscles:  Pelvic muscles are the muscles you use to control urine flow  To target these muscles, stop and start the flow of urine several times  This will help you become familiar with how it feels to tighten and relax these muscles  How to do Kegel exercises:   · Empty your bladder  You may lie down, stand up, or sit down to do these exercises  When you first try to do these exercises, it may be easier if you lie down  Tighten or squeeze your pelvic muscles slowly  It may feel like you are trying to hold back urine or gas  Hold this position for 3 seconds  Relax for 3 seconds  Repeat this cycle 10 times  · Do 10 sets of Kegel exercises, at least 3 times a day  Do not hold your breath when you do Kegel exercises  Keep your stomach, back, and leg muscles relaxed  · As your muscles get stronger, you will be able to hold the squeeze longer  Your healthcare provider may ask that you increase your pelvic muscle squeeze to 10 seconds  After you squeeze for 10 seconds, relax for 10 seconds  What else you should know:   · Once you know how to do Kegel exercises, use different positions  You can do these exercises while you lie on the floor, sit at your desk or watch TV, and while you stand  · You may notice improved bladder control within about 6 weeks  · Tighten your pelvic muscles before you sneeze, cough, or lift to prevent urine leakage      Follow up with your doctor as directed:  Write down your questions so you remember to ask them during your visits  © Copyright [employer] 2022 Information is for End User's use only and may not be sold, redistributed or otherwise used for commercial purposes  All illustrations and images included in CareNotes® are the copyrighted property of A [employer] A M [employer] Inc  or [employer]  The above information is an  only  It is not intended as medical advice for individual conditions or treatments  Talk to your doctor, nurse or pharmacist before following any medical regimen to see if it is safe and effective for you  Wellness Visit for Adults   AMBULATORY CARE:   A wellness visit  is when you see your healthcare provider to get screened for health problems  Your healthcare provider will also give you advice on how to stay healthy  Write down your questions so you remember to ask them  Ask your healthcare provider how often you should have a wellness visit  What happens at a wellness visit:  Your healthcare provider will ask about your health, and your family history of health problems  This includes high blood pressure, heart disease, and cancer  He or she will ask if you have symptoms that concern you, if you smoke, and about your mood  You may also be asked about your intake of medicines, supplements, food, and alcohol  Any of the following may be done:  · Your weight  will be checked  Your height may also be checked so your body mass index (BMI) can be calculated  Your BMI shows if you are at a healthy weight  · Your blood pressure  and heart rate will be checked  Your temperature may also be checked  · Blood and urine tests  may be done  Blood tests may be done to check your cholesterol levels  Abnormal cholesterol levels increase your risk for heart disease and stroke  You may also need a blood or urine test to check for diabetes if you are at increased risk   Urine tests may be done to look for signs of an infection or kidney disease  · A physical exam  includes checking your heartbeat and lungs with a stethoscope  Your healthcare provider may also check your skin to look for sun damage  · Screening tests  may be recommended  A screening test is done to check for diseases that may not cause symptoms  The screening tests you may need depend on your age, gender, family history, and lifestyle habits  For example, colorectal screening may be recommended if you are 48years old or older  Screening tests you need if you are a woman:   · A Pap smear  is used to screen for cervical cancer  Pap smears are usually done every 3 to 5 years depending on your age  You may need them more often if you have had abnormal Pap smear test results in the past  Ask your healthcare provider how often you should have a Pap smear  · A mammogram  is an x-ray of your breasts to screen for breast cancer  Experts recommend mammograms every 2 years starting at age 48 years  You may need a mammogram at age 52 years or younger if you have an increased risk for breast cancer  Talk to your healthcare provider about when you should start having mammograms and how often you need them  Vaccines you may need:   · Get an influenza vaccine  every year  The influenza vaccine protects you from the flu  Several types of viruses cause the flu  The viruses change over time, so new vaccines are made each year  · Get a tetanus-diphtheria (Td) booster vaccine  every 10 years  This vaccine protects you against tetanus and diphtheria  Tetanus is a severe infection that may cause painful muscle spasms and lockjaw  Diphtheria is a severe bacterial infection that causes a thick covering in the back of your mouth and throat  · Get a human papillomavirus (HPV) vaccine  if you are female and aged 23 to 32 or male 23 to 24 and never received it  This vaccine protects you from HPV infection  HPV is the most common infection spread by sexual contact   HPV may also cause vaginal, penile, and anal cancers  · Get a pneumococcal vaccine  if you are aged 72 years or older  The pneumococcal vaccine is an injection given to protect you from pneumococcal disease  Pneumococcal disease is an infection caused by pneumococcal bacteria  The infection may cause pneumonia, meningitis, or an ear infection  · Get a shingles vaccine  if you are 60 or older, even if you have had shingles before  The shingles vaccine is an injection to protect you from the varicella-zoster virus  This is the same virus that causes chickenpox  Shingles is a painful rash that develops in people who had chickenpox or have been exposed to the virus  How to eat healthy:  My Plate is a model for planning healthy meals  It shows the types and amounts of foods that should go on your plate  Fruits and vegetables make up about half of your plate, and grains and protein make up the other half  A serving of dairy is included on the side of your plate  The amount of calories and serving sizes you need depends on your age, gender, weight, and height  Examples of healthy foods are listed below:  · Eat a variety of vegetables  such as dark green, red, and orange vegetables  You can also include canned vegetables low in sodium (salt) and frozen vegetables without added butter or sauces  · Eat a variety of fresh fruits , canned fruit in 100% juice, frozen fruit, and dried fruit  · Include whole grains  At least half of the grains you eat should be whole grains  Examples include whole-wheat bread, wheat pasta, brown rice, and whole-grain cereals such as oatmeal     · Eat a variety of protein foods such as seafood (fish and shellfish), lean meat, and poultry without skin (turkey and chicken)  Examples of lean meats include pork leg, shoulder, or tenderloin, and beef round, sirloin, tenderloin, and extra lean ground beef   Other protein foods include eggs and egg substitutes, beans, peas, soy products, nuts, and seeds  · Choose low-fat dairy products such as skim or 1% milk or low-fat yogurt, cheese, and cottage cheese  · Limit unhealthy fats  such as butter, hard margarine, and shortening  Exercise:  Exercise at least 30 minutes per day on most days of the week  Some examples of exercise include walking, biking, dancing, and swimming  You can also fit in more physical activity by taking the stairs instead of the elevator or parking farther away from stores  Include muscle strengthening activities 2 days each week  Regular exercise provides many health benefits  It helps you manage your weight, and decreases your risk for type 2 diabetes, heart disease, stroke, and high blood pressure  Exercise can also help improve your mood  Ask your healthcare provider about the best exercise plan for you  General health and safety guidelines:   · Do not smoke  Nicotine and other chemicals in cigarettes and cigars can cause lung damage  Ask your healthcare provider for information if you currently smoke and need help to quit  E-cigarettes or smokeless tobacco still contain nicotine  Talk to your healthcare provider before you use these products  · Limit alcohol  A drink of alcohol is 12 ounces of beer, 5 ounces of wine, or 1½ ounces of liquor  · Lose weight, if needed  Being overweight increases your risk of certain health conditions  These include heart disease, high blood pressure, type 2 diabetes, and certain types of cancer  · Protect your skin  Do not sunbathe or use tanning beds  Use sunscreen with a SPF 15 or higher  Apply sunscreen at least 15 minutes before you go outside  Reapply sunscreen every 2 hours  Wear protective clothing, hats, and sunglasses when you are outside  · Drive safely  Always wear your seatbelt  Make sure everyone in your car wears a seatbelt  A seatbelt can save your life if you are in an accident  Do not use your cell phone when you are driving   This could distract you and cause an accident  Pull over if you need to make a call or send a text message  · Practice safe sex  Use latex condoms if are sexually active and have more than one partner  Your healthcare provider may recommend screening tests for sexually transmitted infections (STIs)  · Wear helmets, lifejackets, and protective gear  Always wear a helmet when you ride a bike or motorcycle, go skiing, or play sports that could cause a head injury  Wear protective equipment when you play sports  Wear a lifejacket when you are on a boat or doing water sports  © Copyright [employer] 2022 Information is for End User's use only and may not be sold, redistributed or otherwise used for commercial purposes  All illustrations and images included in CareNotes® are the copyrighted property of [employer]  or [employer]  The above information is an  only  It is not intended as medical advice for individual conditions or treatments  Talk to your doctor, nurse or pharmacist before following any medical regimen to see if it is safe and effective for you

## 2023-01-25 ENCOUNTER — TELEPHONE (OUTPATIENT)
Dept: PSYCHIATRY | Facility: CLINIC | Age: 43
End: 2023-01-25

## 2023-01-25 NOTE — TELEPHONE ENCOUNTER
Called patient regarding referral  Patient is interested in therapy  Prefers [employer] and a female provider

## 2023-01-28 ENCOUNTER — APPOINTMENT (OUTPATIENT)
Dept: LAB | Facility: MEDICAL CENTER | Age: 43
End: 2023-01-28

## 2023-01-28 DIAGNOSIS — K92.1 BLOOD IN STOOL: ICD-10-CM

## 2023-01-28 DIAGNOSIS — K59.00 CONSTIPATION, UNSPECIFIED CONSTIPATION TYPE: ICD-10-CM

## 2023-01-28 DIAGNOSIS — Z13.1 SCREENING FOR DIABETES MELLITUS (DM): ICD-10-CM

## 2023-01-28 DIAGNOSIS — Z11.59 NEED FOR HEPATITIS C SCREENING TEST: ICD-10-CM

## 2023-01-28 DIAGNOSIS — Z11.4 SCREENING FOR HIV (HUMAN IMMUNODEFICIENCY VIRUS): ICD-10-CM

## 2023-01-28 DIAGNOSIS — Z13.220 SCREENING CHOLESTEROL LEVEL: ICD-10-CM

## 2023-01-28 LAB
ALBUMIN SERPL BCP-MCNC: 3.6 G/DL (ref 3.5–5)
ALP SERPL-CCNC: 66 U/L (ref 46–116)
ALT SERPL W P-5'-P-CCNC: 27 U/L (ref 12–78)
ANION GAP SERPL CALCULATED.3IONS-SCNC: 5 MMOL/L (ref 4–13)
AST SERPL W P-5'-P-CCNC: 18 U/L (ref 5–45)
BASOPHILS # BLD AUTO: 0.04 THOUSANDS/ÂΜL (ref 0–0.1)
BASOPHILS NFR BLD AUTO: 1 % (ref 0–1)
BILIRUB SERPL-MCNC: 0.66 MG/DL (ref 0.2–1)
BUN SERPL-MCNC: 16 MG/DL (ref 5–25)
CALCIUM SERPL-MCNC: 9.4 MG/DL (ref 8.3–10.1)
CHLORIDE SERPL-SCNC: 107 MMOL/L (ref 96–108)
CHOLEST SERPL-MCNC: 174 MG/DL
CO2 SERPL-SCNC: 27 MMOL/L (ref 21–32)
CREAT SERPL-MCNC: 0.86 MG/DL (ref 0.6–1.3)
EOSINOPHIL # BLD AUTO: 0.05 THOUSAND/ÂΜL (ref 0–0.61)
EOSINOPHIL NFR BLD AUTO: 1 % (ref 0–6)
ERYTHROCYTE [DISTWIDTH] IN BLOOD BY AUTOMATED COUNT: 13.1 % (ref 11.6–15.1)
EST. AVERAGE GLUCOSE BLD GHB EST-MCNC: 94 MG/DL
GFR SERPL CREATININE-BSD FRML MDRD: 83 ML/MIN/1.73SQ M
GLUCOSE P FAST SERPL-MCNC: 86 MG/DL (ref 65–99)
HBA1C MFR BLD: 4.9 %
HCT VFR BLD AUTO: 41.7 % (ref 34.8–46.1)
HCV AB SER QL: NORMAL
HDLC SERPL-MCNC: 68 MG/DL
HGB BLD-MCNC: 13.2 G/DL (ref 11.5–15.4)
HIV 1+2 AB+HIV1 P24 AG SERPL QL IA: NORMAL
HIV 2 AB SERPL QL IA: NORMAL
HIV1 AB SERPL QL IA: NORMAL
HIV1 P24 AG SERPL QL IA: NORMAL
IMM GRANULOCYTES # BLD AUTO: 0.01 THOUSAND/UL (ref 0–0.2)
IMM GRANULOCYTES NFR BLD AUTO: 0 % (ref 0–2)
LDLC SERPL CALC-MCNC: 94 MG/DL (ref 0–100)
LYMPHOCYTES # BLD AUTO: 2.04 THOUSANDS/ÂΜL (ref 0.6–4.47)
LYMPHOCYTES NFR BLD AUTO: 36 % (ref 14–44)
MCH RBC QN AUTO: 30.8 PG (ref 26.8–34.3)
MCHC RBC AUTO-ENTMCNC: 31.7 G/DL (ref 31.4–37.4)
MCV RBC AUTO: 97 FL (ref 82–98)
MONOCYTES # BLD AUTO: 0.37 THOUSAND/ÂΜL (ref 0.17–1.22)
MONOCYTES NFR BLD AUTO: 7 % (ref 4–12)
NEUTROPHILS # BLD AUTO: 3.21 THOUSANDS/ÂΜL (ref 1.85–7.62)
NEUTS SEG NFR BLD AUTO: 55 % (ref 43–75)
NRBC BLD AUTO-RTO: 0 /100 WBCS
PLATELET # BLD AUTO: 282 THOUSANDS/UL (ref 149–390)
PMV BLD AUTO: 11.3 FL (ref 8.9–12.7)
POTASSIUM SERPL-SCNC: 4.4 MMOL/L (ref 3.5–5.3)
PROT SERPL-MCNC: 6.9 G/DL (ref 6.4–8.4)
RBC # BLD AUTO: 4.29 MILLION/UL (ref 3.81–5.12)
SODIUM SERPL-SCNC: 139 MMOL/L (ref 135–147)
TRIGL SERPL-MCNC: 59 MG/DL
TSH SERPL DL<=0.05 MIU/L-ACNC: 4.5 UIU/ML (ref 0.45–4.5)
WBC # BLD AUTO: 5.72 THOUSAND/UL (ref 4.31–10.16)

## 2023-02-04 ENCOUNTER — APPOINTMENT (OUTPATIENT)
Dept: LAB | Facility: MEDICAL CENTER | Age: 43
End: 2023-02-04

## 2023-02-04 DIAGNOSIS — K92.1 BLOOD IN STOOL: ICD-10-CM

## 2023-02-04 LAB — HEMOCCULT STL QL IA: NEGATIVE

## 2023-02-08 LAB — CALPROTECTIN STL-MCNT: 33 UG/G (ref 0–120)

## 2023-05-11 ENCOUNTER — ANNUAL EXAM (OUTPATIENT)
Dept: OBGYN CLINIC | Facility: MEDICAL CENTER | Age: 43
End: 2023-05-11

## 2023-05-11 VITALS
WEIGHT: 220 LBS | DIASTOLIC BLOOD PRESSURE: 80 MMHG | HEIGHT: 69 IN | BODY MASS INDEX: 32.58 KG/M2 | SYSTOLIC BLOOD PRESSURE: 130 MMHG

## 2023-05-11 DIAGNOSIS — Z01.419 ENCOUNTER FOR ANNUAL ROUTINE GYNECOLOGICAL EXAMINATION: Primary | ICD-10-CM

## 2023-05-11 DIAGNOSIS — Z12.31 ENCOUNTER FOR SCREENING MAMMOGRAM FOR MALIGNANT NEOPLASM OF BREAST: ICD-10-CM

## 2023-05-11 NOTE — PROGRESS NOTES
Subjective:        Malaika Blackwood is a 36 y o  female  Here for Gynecologic Exam      GYN HPI  Here for annual gyn exam  Menstrual cycle:  Patient denies menstrual complaints  Regular monthly menses, not excessive  Vaginal c/o: denies  Urinary c/o: denies  Breast complaints:denies  She does do self breast Exams    Sexually active: yes  Denies problems r/t intimacy/SA  Contraception: n/a- female partner  She reports she feels safe at home  The following portions of the patient's history were reviewed and updated as appropriate: allergies, current medications, past family history, past medical history, past social history, past surgical history, and problem list     Hereditary Cancer Screening  Cancer-related family history includes Breast cancer in her maternal grandmother; Cancer in her maternal grandfather, maternal grandmother, and paternal grandfather; Colon cancer in her maternal grandfather, maternal grandmother, and paternal grandfather; Prostate cancer in her maternal uncle  There is no history of Ovarian cancer or Cervical cancer  Substance Abuse Screening Completed  See hx and flowsheet  Screens Positive for none     HEALTH MAINTENANCE SCREENINGS:    History of abnormal pap: No, Last Papanicolaou test:  03/30/2021  History of abnormal mammogram: No, Last mammogram:  04/14/2023        Review of Systems   Constitutional: Negative for appetite change, chills, fatigue, fever and unexpected weight change  HENT: Negative  Eyes: Negative  Respiratory: Negative for chest tightness and shortness of breath  Cardiovascular: Negative for chest pain and palpitations  Gastrointestinal: Negative for abdominal pain, constipation and vomiting  Endocrine: Negative for cold intolerance and heat intolerance  Genitourinary:        As per HPI   Musculoskeletal: Negative for back pain, joint swelling and neck pain  Skin: Negative for color change and rash     Neurological: Negative for dizziness, "weakness and numbness  Hematological: Does not bruise/bleed easily  Psychiatric/Behavioral: Negative  Objective:  /80 (BP Location: Left arm, Patient Position: Sitting, Cuff Size: Large)   Ht 5' 9\" (1 753 m)   Wt 99 8 kg (220 lb)   LMP 04/24/2023 (Approximate)   BMI 32 49 kg/m²        Physical Exam  Constitutional:       General: She is not in acute distress  Appearance: Normal appearance  Genitourinary:      Vulva and rectum normal       No lesions in the vagina  Right Labia: No rash or lesions  Left Labia: No lesions or rash  No vaginal discharge, erythema, tenderness or bleeding  Right Adnexa: not tender and no mass present  Left Adnexa: not tender and no mass present  No cervical motion tenderness, discharge or friability  Uterus is not enlarged or tender  No urethral prolapse present  Pelvic exam was performed with patient in the lithotomy position  Breasts:     Breasts are symmetrical       Right: No inverted nipple, mass, nipple discharge, skin change or tenderness  Left: No inverted nipple, mass, nipple discharge, skin change or tenderness  HENT:      Head: Normocephalic and atraumatic  Cardiovascular:      Rate and Rhythm: Normal rate  Heart sounds: No murmur heard  Pulmonary:      Effort: Pulmonary effort is normal       Breath sounds: Normal breath sounds  Abdominal:      General: There is no distension  Palpations: Abdomen is soft  Tenderness: There is no abdominal tenderness  Musculoskeletal:         General: Normal range of motion  Cervical back: Normal range of motion  Lymphadenopathy:      Cervical: No cervical adenopathy  Neurological:      Mental Status: She is alert and oriented to person, place, and time  Skin:     General: Skin is warm and dry  Psychiatric:         Mood and Affect: Mood normal          Behavior: Behavior normal    Vitals reviewed           " Assessment/Plan:           Roma Buckley- Primary  Annual GYN examination completed today  Risk prevention and anticipatory guidance provided including:  • Risk for hereditary cancers: Family history reviewed and patient does qualify for referral for genetics consult/testing  Referral to genetics oncology offered as indicated  Declined referral  • Calcium and vitamin D supplementation  • Dietary and lifestyle recommendations based on her age and weight  body mass index is 32 49 kg/m²       • Tobacco and alcohol use, intervention ordered if applicable  Cervical cancer screening  Previous pap smears and ASCCP screening guidelines have been reviewed  Pap smear is not indicated at this time  Contraception   n/a- female partner    STI Screening  STI screening is declined  STI prevention discussed with use of condoms  Breast exam and breast cancer screening  Breast exam was done; , Reviewed recommendation for yearly screening mammogram (as per ACOG, ACS, and [address] [place] [address] Departments), however, also made her aware that there are other professional organizations that may recommend deferring mammograms until age 48 or screening every other year  CBE should still be done yearly, but may miss some cancers and alone is not as effective as breast imaging  Supplemental testing may also be indicated based on lifetime risk for breast cancer as done by [facility] imaging , She is up to date with screening;  Next due 4/2024    Problem List Items Addressed This Visit    None  Visit Diagnoses     Encounter for annual routine gynecological examination    -  Primary    Encounter for screening mammogram for malignant neoplasm of breast        Relevant Orders    Mammo screening bilateral w 3d & cad          Orders Placed This Encounter   Procedures   • Mammo screening bilateral w 3d & cad

## 2023-05-11 NOTE — PROGRESS NOTES
LMP: 2023  PMB:  SA:  YES  HPV:  Birth control:  Last pap: 2021  Negative HPV Negative   Last mammo: 2023:  Last colonoscopy: Not on file  Last Dexa: Not on file  Family History:    MGM- breast cancer & colon cancer   (D)   MGF- colon cancer   (D)   PGF- colon cancer   (D)

## 2023-05-24 ENCOUNTER — TELEPHONE (OUTPATIENT)
Dept: PSYCHIATRY | Facility: CLINIC | Age: 43
End: 2023-05-24

## 2023-10-05 ENCOUNTER — TELEPHONE (OUTPATIENT)
Age: 43
End: 2023-10-05

## 2023-10-05 DIAGNOSIS — R39.9 UTI SYMPTOMS: Primary | ICD-10-CM

## 2023-10-05 RX ORDER — SULFAMETHOXAZOLE AND TRIMETHOPRIM 800; 160 MG/1; MG/1
1 TABLET ORAL 2 TIMES DAILY
Qty: 6 TABLET | Refills: 0 | Status: SHIPPED | OUTPATIENT
Start: 2023-10-05 | End: 2023-10-08

## 2023-10-05 NOTE — TELEPHONE ENCOUNTER
Patient calling stating she has frequent urination and mild discomfort. She went to the pharmacy and did at home UTI test and it came back positive. I tried to offer apt but [facility] is booked today and tomorrow. Can we call something in for patient?   Please call patient back at [phone]    Thank you

## 2023-10-05 NOTE — TELEPHONE ENCOUNTER
Order placed for UA. Bactrim sent to pharmacy, advised taking concurrently with probiotic. Maintain adequate hydration. Seek medical attention if new or worsening symptoms. We will follow-up urine results.

## 2023-10-06 ENCOUNTER — APPOINTMENT (OUTPATIENT)
Dept: LAB | Facility: MEDICAL CENTER | Age: 43
End: 2023-10-06
Payer: COMMERCIAL

## 2023-10-06 DIAGNOSIS — R39.9 UTI SYMPTOMS: ICD-10-CM

## 2023-10-06 LAB
BACTERIA UR QL AUTO: ABNORMAL /HPF
BILIRUB UR QL STRIP: NEGATIVE
CAOX CRY URNS QL MICRO: ABNORMAL /HPF
CLARITY UR: CLEAR
COLOR UR: ABNORMAL
GLUCOSE UR STRIP-MCNC: NEGATIVE MG/DL
HGB UR QL STRIP.AUTO: NEGATIVE
KETONES UR STRIP-MCNC: NEGATIVE MG/DL
LEUKOCYTE ESTERASE UR QL STRIP: NEGATIVE
MUCOUS THREADS UR QL AUTO: ABNORMAL
NITRITE UR QL STRIP: NEGATIVE
NON-SQ EPI CELLS URNS QL MICRO: ABNORMAL /HPF
PH UR STRIP.AUTO: 5.5 [PH]
PROT UR STRIP-MCNC: ABNORMAL MG/DL
RBC #/AREA URNS AUTO: ABNORMAL /HPF
SP GR UR STRIP.AUTO: 1.02 (ref 1–1.03)
UROBILINOGEN UR STRIP-ACNC: <2 MG/DL
WBC #/AREA URNS AUTO: ABNORMAL /HPF

## 2023-10-06 PROCEDURE — 81001 URINALYSIS AUTO W/SCOPE: CPT

## 2023-12-04 DIAGNOSIS — B00.1 RECURRENT COLD SORES: ICD-10-CM

## 2023-12-04 RX ORDER — VALACYCLOVIR HYDROCHLORIDE 1 G/1
2000 TABLET, FILM COATED ORAL 2 TIMES DAILY
Qty: 32 TABLET | Refills: 1 | Status: SHIPPED | OUTPATIENT
Start: 2023-12-04 | End: 2023-12-05

## 2024-01-31 ENCOUNTER — OFFICE VISIT (OUTPATIENT)
Dept: FAMILY MEDICINE CLINIC | Facility: MEDICAL CENTER | Age: 44
End: 2024-01-31
Payer: COMMERCIAL

## 2024-01-31 VITALS
TEMPERATURE: 97.9 F | WEIGHT: 227 LBS | HEIGHT: 69 IN | HEART RATE: 74 BPM | BODY MASS INDEX: 33.62 KG/M2 | OXYGEN SATURATION: 98 % | SYSTOLIC BLOOD PRESSURE: 142 MMHG | DIASTOLIC BLOOD PRESSURE: 90 MMHG

## 2024-01-31 DIAGNOSIS — Z12.11 SCREEN FOR COLON CANCER: ICD-10-CM

## 2024-01-31 DIAGNOSIS — Z23 ENCOUNTER FOR IMMUNIZATION: ICD-10-CM

## 2024-01-31 DIAGNOSIS — R03.0 ELEVATED BLOOD PRESSURE READING: ICD-10-CM

## 2024-01-31 DIAGNOSIS — Z00.00 ANNUAL PHYSICAL EXAM: Primary | ICD-10-CM

## 2024-01-31 PROCEDURE — 99396 PREV VISIT EST AGE 40-64: CPT | Performed by: STUDENT IN AN ORGANIZED HEALTH CARE EDUCATION/TRAINING PROGRAM

## 2024-01-31 PROCEDURE — 90686 IIV4 VACC NO PRSV 0.5 ML IM: CPT

## 2024-01-31 PROCEDURE — 90471 IMMUNIZATION ADMIN: CPT

## 2024-01-31 NOTE — PROGRESS NOTES
[facility] HEALTH MAINTENANCE OFFICE VISIT  [facility] - [facility] [place]    NAME: Manisha Askew  AGE: 43 y.o. SEX: female  : 1980     DATE: 2024    Assessment and Plan     1. Annual physical exam    2. Encounter for immunization  -     influenza vaccine, quadrivalent, 0.5 mL, preservative-free, for adult and pediatric patients 6 mos+ (AFLURIA, FLUARIX, FLULAVAL, FLUZONE)    3. Screen for colon cancer  -     Ambulatory Referral to Gastroenterology; Future    4. Elevated blood pressure reading  Assessment & Plan:  Return to office in 2 weeks for nurse visit for blood pressure recheck          Patient Counseling:   Nutrition: Stressed importance of a well balanced diet, moderation of sodium/saturated fat, caloric balance and sufficient intake of fiber  Exercise: Stressed the importance of regular exercise with a goal of 150 minutes per week  Dental Health: Discussed daily flossing and brushing and regular dental visits   Injury Prevention: Discussed Safety Belts, Safety Helmets, and Smoke Detectors  No tobacco use and no alcohol use  Uses sunscreen appropriately  Immunizations reviewed:   Counseled about influenza vaccine, agreeable to receiving today  Informed about newest COVID-19 vaccine  Tetanus booster up-to-date  Discussed benefits of:    Has gynecology visit scheduled with gynecologist on 2024  3/30/2021 Pap smear with cotesting cytology and negative HPV  2023 fit test negative  Patient will be turning 44 years old in 2024, referral provided to gastroenterology, agreeable for colonoscopy, family history of colon cancer in maternal grandparents     BMI Counseling: Body mass index is 33.52 kg/m².     Return to office in 2 weeks for nurse visit for blood pressure recheck and in 1 year for next physical and more often as needed.  Chief Complaint     Chief Complaint   Patient presents with    Annual Exam       History of Present Illness     HPI    Well  Adult Physical   Patient here for a comprehensive physical exam.      Diet and Physical Activity  Diet: Patient states aware could improve some dietary aspects such as cutting out processed foods, is trying to cook more home-cooked, fresh meals  Exercise: weekly      Depression Screen  PHQ-2/9 Depression Screening    Little interest or pleasure in doing things: 1 - several days  Feeling down, depressed, or hopeless: 1 - several days  PHQ-2 Score: 2  PHQ-2 Interpretation: Negative depression screen          General Health  Hearing: Normal:  bilateral  Vision: goes for regular eye exams and wears glasses and contacts  Dental: regular dental visits, brushes teeth twice daily, and flosses teeth daily    Reproductive Health  Follows with gynecologist      The following portions of the patient's history were reviewed and updated as appropriate: allergies, current medications, past family history, past medical history, past social history, past surgical history and problem list.    Review of Systems     Review of Systems    As noted in HPI     Past Medical History     Past Medical History:   Diagnosis Date    Allergic     Anxiety disorder     Varicella        Past Surgical History     History reviewed. No pertinent surgical history.    Social History     Social History     Socioeconomic History    Marital status: Single     Spouse name: None    Number of children: None    Years of education: None    Highest education level: None   Occupational History    None   Tobacco Use    Smoking status: Never    Smokeless tobacco: Never   Vaping Use    Vaping status: Never Used   Substance and Sexual Activity    Alcohol use: Not Currently     Comment: rare use    Drug use: Not Currently    Sexual activity: Yes     Partners: Female     Birth control/protection: None   Other Topics Concern    None   Social History Narrative    · Most recent tobacco use screenin2018      Social Determinants of Health     Financial Resource  "Strain: Not on file   Food Insecurity: Not on file   Transportation Needs: Not on file   Physical Activity: Not on file   Stress: Not on file   Social Connections: Not on file   Intimate Partner Violence: Not on file   Housing Stability: Not on file       Family History     Family History   Problem Relation Age of Onset    Cancer Maternal Grandmother     Diabetes Maternal Grandmother     Breast cancer Maternal Grandmother         over 50    Colon cancer Maternal Grandmother         over 50    Cancer Maternal Grandfather     Colon cancer Maternal Grandfather         over 50    Cancer Paternal Grandfather     Colon cancer Paternal Grandfather         over 50    No Known Problems Mother     No Known Problems Father     No Known Problems Sister     No Known Problems Brother     No Known Problems Maternal Aunt     No Known Problems Paternal Uncle     No Known Problems Paternal Uncle     No Known Problems Maternal Aunt     No Known Problems Maternal Aunt     Prostate cancer Maternal Uncle     Ovarian cancer Neg Hx     Cervical cancer Neg Hx        Current Medications       Current Outpatient Medications:     psyllium (METAMUCIL) 58.6 % packet, Take 1 packet by mouth daily, Disp: 75 each, Rfl: 0    valACYclovir (VALTREX) 1,000 mg tablet, Take 2 tablets (2,000 mg total) by mouth 2 (two) times a day for 1 day Take 2000 mg twice a day for 1 day ASAP after symptom onset of cold sore, Disp: 32 tablet, Rfl: 1     Allergies     No Known Allergies    Objective     /90 (BP Location: Right arm, Patient Position: Sitting, Cuff Size: Large)   Pulse 74   Temp 97.9 °F (36.6 °C) (Temporal)   Ht 5' 9\" (1.753 m)   Wt 103 kg (227 lb)   LMP 01/17/2024 (Approximate)   SpO2 98%   BMI 33.52 kg/m²      Physical Exam  Vitals reviewed.   Constitutional:       General: She is not in acute distress.     Appearance: Normal appearance. She is obese.   HENT:      Head: Normocephalic and atraumatic.      Right Ear: External ear normal.      " Left Ear: External ear normal.      Nose: Nose normal.      Mouth/Throat:      Mouth: Mucous membranes are moist.      Pharynx: Oropharynx is clear.   Eyes:      Extraocular Movements: Extraocular movements intact.      Conjunctiva/sclera: Conjunctivae normal.      Pupils: Pupils are equal, round, and reactive to light.   Cardiovascular:      Rate and Rhythm: Normal rate and regular rhythm.      Pulses: Normal pulses.      Heart sounds: Normal heart sounds.   Pulmonary:      Effort: Pulmonary effort is normal.      Breath sounds: Normal breath sounds.   Abdominal:      General: Abdomen is flat. Bowel sounds are normal.      Palpations: Abdomen is soft.      Tenderness: There is no abdominal tenderness.   Musculoskeletal:      Cervical back: Neck supple.      Right lower leg: No edema.      Left lower leg: No edema.   Lymphadenopathy:      Cervical: No cervical adenopathy.   Skin:     General: Skin is warm and dry.      Capillary Refill: Capillary refill takes less than 2 seconds.   Neurological:      Mental Status: She is alert and oriented to person, place, and time.   Psychiatric:         Mood and Affect: Mood normal.         Behavior: Behavior normal.         Thought Content: Thought content normal.                 Joe Alexandra DO  [facility]

## 2024-04-17 ENCOUNTER — HOSPITAL ENCOUNTER (OUTPATIENT)
Dept: RADIOLOGY | Facility: MEDICAL CENTER | Age: 44
Discharge: HOME | End: 2024-04-17
Payer: COMMERCIAL

## 2024-04-17 VITALS — BODY MASS INDEX: 33.62 KG/M2 | HEIGHT: 69 IN | WEIGHT: 227 LBS

## 2024-04-17 DIAGNOSIS — Z12.31 ENCOUNTER FOR SCREENING MAMMOGRAM FOR MALIGNANT NEOPLASM OF BREAST: ICD-10-CM

## 2024-04-17 PROCEDURE — 77067 SCR MAMMO BI INCL CAD: CPT

## 2024-04-17 PROCEDURE — 77063 BREAST TOMOSYNTHESIS BI: CPT

## 2024-05-02 ENCOUNTER — TELEPHONE (OUTPATIENT)
Dept: GASTROENTEROLOGY | Facility: AMBULARY SURGERY CENTER | Age: 44
End: 2024-05-02

## 2024-05-14 NOTE — PROGRESS NOTES
Subjective:      Manisha Askew is a 43 y.o. female. Here for Gynecologic Exam (Pap 3/30/21 -/-/LMP /Birth control /Mammo 4/17/24 bi-rads 1/Colonoscopy ref in /Maternal grandmother / paternal/ maternal grandfather colon cancer )    GYN HPI  Menstrual cycle:  Patient denies menstrual complaints. Regular monthly menses, not excessive  for the past 6-12 months have  been having more PMS- mood symptoms. Scattered brained, and depressed.  Vaginal c/o: denies  Urinary c/o: wears pantiliner daily due to DAVID.  Breast complaints:denies  She does not do self breast Exams    Sexually active: not at present, but would be; Female partner  Contraception: n/a  She reports she feels safe at home.     Dietary calcium/vit D intake: moderate intake  Lifestyle: walking    HEALTH MAINTENANCE SCREENINGS:    Last Papanicolaou test:  03/30/2021   History of abnormal pap: No  Last mammogram:  04/17/2024      Hereditary Cancer Screening  Cancer-related family history includes Breast cancer in her maternal grandmother; Cancer in her maternal grandfather, maternal grandmother, and paternal grandfather; Colon cancer in her maternal grandfather, maternal grandmother, and paternal grandfather; Prostate cancer in her maternal uncle. There is no history of Ovarian cancer or Cervical cancer.     Substance Abuse Screening Completed. See hx and flowsheet.    The following portions of the patient's history were reviewed and updated as appropriate: allergies, current medications, past family history, past medical history, past social history, past surgical history, and problem list.         Review of Systems   Constitutional:  Negative for appetite change, chills, fatigue, fever and unexpected weight change.   HENT: Negative.     Eyes: Negative.    Respiratory:  Negative for chest tightness and shortness of breath.    Cardiovascular:  Negative for chest pain and palpitations.   Gastrointestinal:  Negative for abdominal pain, constipation and vomiting.  "  Endocrine: Negative for cold intolerance and heat intolerance.   Genitourinary:         As per HPI   Musculoskeletal:  Negative for back pain, joint swelling and neck pain.   Skin:  Negative for color change and rash.   Neurological:  Negative for dizziness, weakness and numbness.   Hematological:  Does not bruise/bleed easily.   Psychiatric/Behavioral: Negative.               Objective:  /84 (BP Location: Left arm, Patient Position: Sitting, Cuff Size: Large)   Ht 5' 9\" (1.753 m)   Wt 93.9 kg (207 lb)   LMP 04/23/2024 (Exact Date)   BMI 30.57 kg/m²        Physical Exam  Constitutional:       General: She is not in acute distress.     Appearance: Normal appearance.   Genitourinary:      Vulva and rectum normal.      No lesions in the vagina.      Right Labia: No rash or lesions.     Left Labia: No lesions or rash.     No vaginal discharge, erythema, tenderness or bleeding.        Right Adnexa: not tender and no mass present.     Left Adnexa: not tender and no mass present.     No cervical motion tenderness, discharge or friability.      Uterus is not enlarged or tender.      No urethral prolapse present.      Pelvic exam was performed with patient in the lithotomy position.   Breasts:     Breasts are symmetrical.      Right: No inverted nipple, mass, nipple discharge, skin change or tenderness.      Left: No inverted nipple, mass, nipple discharge, skin change or tenderness.   HENT:      Head: Normocephalic and atraumatic.   Cardiovascular:      Rate and Rhythm: Normal rate.      Heart sounds: No murmur heard.  Pulmonary:      Effort: Pulmonary effort is normal.      Breath sounds: Normal breath sounds.   Abdominal:      General: There is no distension.      Palpations: Abdomen is soft.      Tenderness: There is no abdominal tenderness.   Musculoskeletal:         General: Normal range of motion.      Cervical back: Normal range of motion.   Lymphadenopathy:      Cervical: No cervical adenopathy. "   Neurological:      Mental Status: She is alert and oriented to person, place, and time.   Skin:     General: Skin is warm and dry.   Psychiatric:         Mood and Affect: Mood normal.         Behavior: Behavior normal.   Vitals reviewed.             Assessment/Plan:           ANNUAL GYN EXAM- Primary  Annual GYN examination completed today.   Health maintenance reviewed/updated as appropriate  Cervical cancer screen: Previous pap smears and ASCCP screening guidelines have been reviewed. Pap due 2026.  Breast Health: Encouraged regular self breast exams. Mammo ordered.    Risk prevention and anticipatory guidance provided including:  Age related Calcium and vitamin D intake  Dietary and lifestyle recommendations based on her age and weight. body mass index is 30.57 kg/m²..    Tobacco and alcohol use, intervention ordered if applicable.   Condom use for prevention of STI's. declined STI Screening.  Contraception:  N/A- not sexually active    Problem List Items Addressed This Visit       PMDD (premenstrual dysphoric disorder)     Pt reporting increased mood changes pre-menstrually. Feels very bothered by this. Does not want hormonal tx. Offered sarafem/prozac and is agreeable. F/u in 4- 6months          Relevant Medications    Fluoxetine HCl, PMDD, (Sarafem) 10 MG TABS     Other Visit Diagnoses       Encounter for gynecological examination (general) (routine) without abnormal findings    -  Primary    Encounter for screening mammogram for breast cancer        Relevant Orders    Mammo screening bilateral w 3d & cad    Screen for colon cancer                Orders Placed This Encounter   Procedures    Mammo screening bilateral w 3d & cad

## 2024-05-16 ENCOUNTER — ANNUAL EXAM (OUTPATIENT)
Dept: OBGYN CLINIC | Facility: MEDICAL CENTER | Age: 44
End: 2024-05-16
Payer: COMMERCIAL

## 2024-05-16 VITALS
BODY MASS INDEX: 30.66 KG/M2 | SYSTOLIC BLOOD PRESSURE: 124 MMHG | DIASTOLIC BLOOD PRESSURE: 84 MMHG | HEIGHT: 69 IN | WEIGHT: 207 LBS

## 2024-05-16 DIAGNOSIS — F32.81 PMDD (PREMENSTRUAL DYSPHORIC DISORDER): ICD-10-CM

## 2024-05-16 DIAGNOSIS — Z01.419 ENCOUNTER FOR GYNECOLOGICAL EXAMINATION (GENERAL) (ROUTINE) WITHOUT ABNORMAL FINDINGS: Primary | ICD-10-CM

## 2024-05-16 DIAGNOSIS — Z12.31 ENCOUNTER FOR SCREENING MAMMOGRAM FOR BREAST CANCER: ICD-10-CM

## 2024-05-16 DIAGNOSIS — Z12.11 SCREEN FOR COLON CANCER: ICD-10-CM

## 2024-05-16 PROCEDURE — 99396 PREV VISIT EST AGE 40-64: CPT | Performed by: CLINICAL NURSE SPECIALIST

## 2024-05-16 RX ORDER — FLUOXETINE 10 MG/1
TABLET ORAL
Qty: 14 TABLET | Refills: 6 | Status: SHIPPED | OUTPATIENT
Start: 2024-05-16 | End: 2024-05-24 | Stop reason: SDUPTHER

## 2024-05-16 NOTE — ASSESSMENT & PLAN NOTE
Pt reporting increased mood changes pre-menstrually. Feels very bothered by this. Does not want hormonal tx. Offered sarafem/prozac and is agreeable. F/u in 4- 6months

## 2024-05-24 ENCOUNTER — TELEPHONE (OUTPATIENT)
Dept: OBGYN CLINIC | Facility: CLINIC | Age: 44
End: 2024-05-24

## 2024-05-24 DIAGNOSIS — F32.81 PMDD (PREMENSTRUAL DYSPHORIC DISORDER): ICD-10-CM

## 2024-05-24 RX ORDER — FLUOXETINE 10 MG/1
TABLET ORAL
Qty: 14 TABLET | Refills: 6 | Status: SHIPPED | OUTPATIENT
Start: 2024-05-24 | End: 2024-05-24

## 2024-05-24 RX ORDER — FLUOXETINE 10 MG/1
CAPSULE ORAL
Qty: 14 CAPSULE | Refills: 6 | Status: SHIPPED | OUTPATIENT
Start: 2024-05-24

## 2024-05-24 NOTE — TELEPHONE ENCOUNTER
Caller: Eileen    Reason for call: Patient would like this changed to a 30 day supply so she does not forget! She does not feel comfortable going off of it shelter through the month.     Call back#: [phone]

## 2024-05-24 NOTE — TELEPHONE ENCOUNTER
Pharmacy called pod, warm transferred to me. Pharmacist , Margot stated brand Sarafem would cost pt (with insurance) $154 where as Generic would cost her zero dollars with her insurance.     OK to prescribe generic Fluoxetine?

## 2024-06-27 ENCOUNTER — TELEPHONE (OUTPATIENT)
Dept: PSYCHIATRY | Facility: CLINIC | Age: 44
End: 2024-06-27

## 2024-06-27 NOTE — TELEPHONE ENCOUNTER
Due to no response to MyChart wait list letter, patient will be removed from talk therapy wait list.

## 2024-07-23 ENCOUNTER — OFFICE VISIT (OUTPATIENT)
Dept: GASTROENTEROLOGY | Facility: AMBULARY SURGERY CENTER | Age: 44
End: 2024-07-23
Payer: COMMERCIAL

## 2024-07-23 VITALS
SYSTOLIC BLOOD PRESSURE: 124 MMHG | WEIGHT: 206.6 LBS | OXYGEN SATURATION: 99 % | HEART RATE: 61 BPM | BODY MASS INDEX: 30.6 KG/M2 | HEIGHT: 69 IN | DIASTOLIC BLOOD PRESSURE: 68 MMHG

## 2024-07-23 DIAGNOSIS — Z12.11 SCREEN FOR COLON CANCER: ICD-10-CM

## 2024-07-23 PROCEDURE — 99203 OFFICE O/P NEW LOW 30 MIN: CPT | Performed by: INTERNAL MEDICINE

## 2024-07-23 NOTE — PATIENT INSTRUCTIONS
Scheduled date of colonoscopy (as of today): Oct. 17, 2024  Physician performing colonoscopy: DR. Orozco  Location of colonoscopy: [facility]  Bowel prep reviewed with patient: Clenpiq  Instructions reviewed with patient by: TOI   Clearances: N/A

## 2024-07-23 NOTE — PROGRESS NOTES
Ambulatory Visit  Name: Manisha Askew      : 1980      MRN: [id number]  Encounter Provider: Criselda Orozco MD  Encounter Date: 2024   Encounter department: [facility] [place]    Assessment & Plan   1. Screen for colon cancer  -     Ambulatory Referral to Gastroenterology  -     Colonoscopy; Future; Expected date: 2024  -     sodium picosulfate, magnesium oxide, citric acid (Clenpiq) oral solution; Take 175 mL (1 bottle) the evening before the colonoscopy, between 5 PM and 9 PM, followed by a second 175 mL bottle 5 hours before the colonoscopy.  Average risk, index screening colonoscopy  I obtained informed consent from the patient. The risks/benefits/alternatives of the procedure were discussed with the patient. Risks included, but not limited to, infection, bleeding, perforation, injury to organs in the abdomen, missed lesion and incomplete procedure were discussed. Patient was agreeable and electronic signature was obtained.    History of Present Illness     Manisha Askew is a 44 y.o. female who presents for colon cancer screening.  She has premenstrual dysmorphic disorder, BMI 30.     She has never previously had a colonoscopy.  She describes colon cancer in her maternal and paternal grandparents.  Her mother and father did not have colon polyps.  Reports bowel movements daily or every other day, she did have episodes of blood in her stool a year ago however this has resolved.  She occasionally feels gassy with acid reflux, but denies effect on her quality of life.  She denies any dysphagia, unintentional weight loss.  She is a non-smoker.  Denies alcohol use.  Denies abdominal pain.      Review of Systems   Constitutional:  Negative for chills and fever.   HENT:  Negative for ear pain and sore throat.    Eyes:  Negative for pain and visual disturbance.   Respiratory:  Negative for cough and shortness of breath.    Cardiovascular:  Negative for chest pain and  "palpitations.   Gastrointestinal:  Positive for abdominal pain. Negative for constipation, diarrhea, nausea and vomiting.   Genitourinary:  Negative for dysuria and hematuria.   Musculoskeletal:  Negative for arthralgias, back pain and myalgias.   Skin:  Negative for color change and rash.   Neurological:  Negative for seizures, syncope and headaches.   All other systems reviewed and are negative.      Objective     /68 (BP Location: Left arm, Patient Position: Sitting, Cuff Size: Standard)   Pulse 61   Ht 5' 9\" (1.753 m)   Wt 93.7 kg (206 lb 9.6 oz)   SpO2 99%   BMI 30.51 kg/m²     Physical Exam  Vitals and nursing note reviewed.   Constitutional:       General: She is not in acute distress.     Appearance: She is well-developed.   HENT:      Head: Normocephalic and atraumatic.   Eyes:      Conjunctiva/sclera: Conjunctivae normal.   Cardiovascular:      Rate and Rhythm: Normal rate and regular rhythm.      Heart sounds: No murmur heard.  Pulmonary:      Effort: Pulmonary effort is normal. No respiratory distress.      Breath sounds: Normal breath sounds.   Abdominal:      Palpations: Abdomen is soft.      Tenderness: There is no abdominal tenderness.   Musculoskeletal:         General: No swelling.      Cervical back: Neck supple.   Skin:     General: Skin is warm and dry.      Capillary Refill: Capillary refill takes less than 2 seconds.   Neurological:      Mental Status: She is alert.   Psychiatric:         Mood and Affect: Mood normal.       Administrative Statements           "

## 2024-10-02 ENCOUNTER — TELEPHONE (OUTPATIENT)
Age: 44
End: 2024-10-02

## 2024-10-02 DIAGNOSIS — Z12.11 SCREEN FOR COLON CANCER: ICD-10-CM

## 2024-10-02 NOTE — TELEPHONE ENCOUNTER
Patients GI provider:  Dr. Orozco    Number to return call: [phone]    Reason for call: Pt called in requesting a prescription for clenpiq to be sent to the [facility] pharmacy listed on chart. Thank you.    Scheduled procedure/appointment date if applicable: Apt/procedure 10/17/2024

## 2024-10-03 ENCOUNTER — ANESTHESIA (OUTPATIENT)
Dept: ANESTHESIOLOGY | Facility: HOSPITAL | Age: 44
End: 2024-10-03

## 2024-10-03 ENCOUNTER — ANESTHESIA EVENT (OUTPATIENT)
Dept: ANESTHESIOLOGY | Facility: HOSPITAL | Age: 44
End: 2024-10-03

## 2024-10-10 ENCOUNTER — TELEPHONE (OUTPATIENT)
Dept: GASTROENTEROLOGY | Facility: AMBULARY SURGERY CENTER | Age: 44
End: 2024-10-10

## 2024-10-17 ENCOUNTER — ANESTHESIA EVENT (OUTPATIENT)
Dept: GASTROENTEROLOGY | Facility: AMBULARY SURGERY CENTER | Age: 44
End: 2024-10-17
Payer: COMMERCIAL

## 2024-10-17 ENCOUNTER — HOSPITAL ENCOUNTER (OUTPATIENT)
Dept: GASTROENTEROLOGY | Facility: AMBULARY SURGERY CENTER | Age: 44
Setting detail: OUTPATIENT SURGERY
End: 2024-10-17
Attending: INTERNAL MEDICINE
Payer: COMMERCIAL

## 2024-10-17 VITALS
HEART RATE: 56 BPM | OXYGEN SATURATION: 98 % | DIASTOLIC BLOOD PRESSURE: 63 MMHG | BODY MASS INDEX: 31.1 KG/M2 | HEIGHT: 69 IN | SYSTOLIC BLOOD PRESSURE: 100 MMHG | RESPIRATION RATE: 16 BRPM | TEMPERATURE: 97 F | WEIGHT: 210 LBS

## 2024-10-17 DIAGNOSIS — Z12.11 SCREEN FOR COLON CANCER: ICD-10-CM

## 2024-10-17 LAB
EXT PREGNANCY TEST URINE: NEGATIVE
EXT. CONTROL: NORMAL

## 2024-10-17 PROCEDURE — 45380 COLONOSCOPY AND BIOPSY: CPT | Performed by: INTERNAL MEDICINE

## 2024-10-17 PROCEDURE — 88305 TISSUE EXAM BY PATHOLOGIST: CPT | Performed by: PATHOLOGY

## 2024-10-17 PROCEDURE — 81025 URINE PREGNANCY TEST: CPT | Performed by: INTERNAL MEDICINE

## 2024-10-17 RX ORDER — PROPOFOL 10 MG/ML
INJECTION, EMULSION INTRAVENOUS AS NEEDED
Status: DISCONTINUED | OUTPATIENT
Start: 2024-10-17 | End: 2024-10-17

## 2024-10-17 RX ORDER — OMEGA-3S/DHA/EPA/FISH OIL/D3 300MG-1000
400 CAPSULE ORAL DAILY
COMMUNITY

## 2024-10-17 RX ORDER — LIDOCAINE HYDROCHLORIDE 10 MG/ML
INJECTION, SOLUTION EPIDURAL; INFILTRATION; INTRACAUDAL; PERINEURAL AS NEEDED
Status: DISCONTINUED | OUTPATIENT
Start: 2024-10-17 | End: 2024-10-17

## 2024-10-17 RX ORDER — SODIUM CHLORIDE, SODIUM LACTATE, POTASSIUM CHLORIDE, CALCIUM CHLORIDE 600; 310; 30; 20 MG/100ML; MG/100ML; MG/100ML; MG/100ML
INJECTION, SOLUTION INTRAVENOUS CONTINUOUS PRN
Status: DISCONTINUED | OUTPATIENT
Start: 2024-10-17 | End: 2024-10-17

## 2024-10-17 RX ADMIN — PROPOFOL 50 MG: 10 INJECTION, EMULSION INTRAVENOUS at 14:02

## 2024-10-17 RX ADMIN — PROPOFOL 140 MCG/KG/MIN: 10 INJECTION, EMULSION INTRAVENOUS at 13:59

## 2024-10-17 RX ADMIN — LIDOCAINE HYDROCHLORIDE 50 MG: 10 INJECTION, SOLUTION EPIDURAL; INFILTRATION; INTRACAUDAL at 13:58

## 2024-10-17 RX ADMIN — PROPOFOL 130 MG: 10 INJECTION, EMULSION INTRAVENOUS at 13:58

## 2024-10-17 RX ADMIN — PROPOFOL 50 MG: 10 INJECTION, EMULSION INTRAVENOUS at 14:07

## 2024-10-17 RX ADMIN — SODIUM CHLORIDE, SODIUM LACTATE, POTASSIUM CHLORIDE, AND CALCIUM CHLORIDE: .6; .31; .03; .02 INJECTION, SOLUTION INTRAVENOUS at 13:56

## 2024-10-17 NOTE — H&P
"History and Physical -  [facility]  Manisha Askew 44 y.o. female MRN: [id number]                  HPI: Manisha Askew is a 44 y.o. year old female who presents for index screening colonoscopy.      REVIEW OF SYSTEMS: Per the HPI, and otherwise unremarkable.    Historical Information   Past Medical History:   Diagnosis Date    Allergic     Anxiety     Anxiety disorder     Fx ankle     b/l    Varicella      History reviewed. No pertinent surgical history.  Social History   Social History     Substance and Sexual Activity   Alcohol Use Yes    Comment: rare use     Social History     Substance and Sexual Activity   Drug Use Not Currently     Social History     Tobacco Use   Smoking Status Never   Smokeless Tobacco Never     Family History   Problem Relation Age of Onset    Cancer Maternal Grandmother     Diabetes Maternal Grandmother     Breast cancer Maternal Grandmother         over 50    Colon cancer Maternal Grandmother         over 50    Cancer Maternal Grandfather     Colon cancer Maternal Grandfather         over 50    Cancer Paternal Grandfather     Colon cancer Paternal Grandfather         over 50    No Known Problems Mother     No Known Problems Father     No Known Problems Sister     No Known Problems Brother     No Known Problems Maternal Aunt     No Known Problems Paternal Uncle     No Known Problems Paternal Uncle     No Known Problems Maternal Aunt     No Known Problems Maternal Aunt     Prostate cancer Maternal Uncle     Ovarian cancer Neg Hx     Cervical cancer Neg Hx        Meds/Allergies       Current Outpatient Medications:     cholecalciferol (VITAMIN D3) 400 units tablet    FLUoxetine (PROzac) 10 mg capsule    valACYclovir (VALTREX) 1,000 mg tablet    No Known Allergies    Objective     /86   Pulse 91   Temp 97.5 °F (36.4 °C) (Temporal)   Resp 18   Ht 5' 9\" (1.753 m)   Wt 95.3 kg (210 lb)   LMP 10/16/2024 (Exact Date)   SpO2 97%   BMI 31.01 kg/m²       PHYSICAL " EXAM    Gen: NAD  Head: NCAT  CV: RRR  CHEST: Clear  ABD: soft, NT/ND  EXT: no edema      ASSESSMENT/PLAN:  This is a 44 y.o. year old female here for colonoscopy, and she is stable and optimized for her procedure.

## 2024-10-17 NOTE — ANESTHESIA POSTPROCEDURE EVALUATION
Post-Op Assessment Note    CV Status:  Stable    Pain management: adequate       Mental Status:  Alert and awake   Hydration Status:  Euvolemic   PONV Controlled:  Controlled   Airway Patency:  Patent     Post Op Vitals Reviewed: Yes    No anethesia notable event occurred.    Staff: CRNA           Last Filed PACU Vitals:  Vitals Value Taken Time   Temp 97 °F (36.1 °C) 10/17/24 1427   Pulse 90 10/17/24 1427   /66 10/17/24 1427   Resp 16 10/17/24 1427   SpO2 100 % 10/17/24 1427       Modified Clarissa:  Activity: 2 (10/17/2024  2:28 PM)  Respiration: 2 (10/17/2024  2:28 PM)  Circulation: 2 (10/17/2024  2:28 PM)  Consciousness: 2 (10/17/2024  2:28 PM)  Oxygen Saturation: 2 (10/17/2024  2:28 PM)  Modified Clarissa Score: 10 (10/17/2024  2:28 PM)

## 2024-10-17 NOTE — ANESTHESIA POSTPROCEDURE EVALUATION
Post-Op Assessment Note    CV Status:  Stable  Pain Score: 0    Pain management: adequate       Mental Status:  Alert and awake   Hydration Status:  Euvolemic   PONV Controlled:  Controlled   Airway Patency:  Patent     Post Op Vitals Reviewed: Yes    No anethesia notable event occurred.    Staff: Anesthesiologist         Last Filed PACU Vitals:  Vitals Value Taken Time   Temp 97 °F (36.1 °C) 10/17/24 1435   Pulse 56 10/17/24 1435   /63 10/17/24 1435   Resp 16 10/17/24 1435   SpO2 98 % 10/17/24 1435       Modified Clarissa:  Activity: 2 (10/17/2024  2:35 PM)  Respiration: 2 (10/17/2024  2:35 PM)  Circulation: 2 (10/17/2024  2:35 PM)  Consciousness: 2 (10/17/2024  2:35 PM)  Oxygen Saturation: 2 (10/17/2024  2:35 PM)  Modified Clarissa Score: 10 (10/17/2024  2:35 PM)

## 2024-10-17 NOTE — ANESTHESIA PREPROCEDURE EVALUATION
Procedure:  COLONOSCOPY    Relevant Problems   NEURO/PSYCH   (+) PMDD (premenstrual dysphoric disorder)      PULMONARY   (-) Smoking   (-) URI (upper respiratory infection)        Physical Exam    Airway    Mallampati score: II  TM Distance: >3 FB  Neck ROM: full     Dental   No notable dental hx     Cardiovascular      Pulmonary      Other Findings  post-pubertal.      Anesthesia Plan  ASA Score- 1     Anesthesia Type- IV sedation with anesthesia with ASA Monitors.         Additional Monitors:     Airway Plan:            Plan Factors-Exercise tolerance (METS): >4 METS.    Chart reviewed.    Patient summary reviewed.    Patient is not a current smoker.              Induction- intravenous.    Postoperative Plan-         Informed Consent- Anesthetic plan and risks discussed with patient.  I personally reviewed this patient with the CRNA. Discussed and agreed on the Anesthesia Plan with the CRNA..

## 2024-10-23 PROCEDURE — 88305 TISSUE EXAM BY PATHOLOGIST: CPT | Performed by: PATHOLOGY

## 2024-11-22 DIAGNOSIS — B00.1 RECURRENT COLD SORES: ICD-10-CM

## 2024-11-22 RX ORDER — VALACYCLOVIR HYDROCHLORIDE 1 G/1
2000 TABLET, FILM COATED ORAL 2 TIMES DAILY
Qty: 32 TABLET | Refills: 0 | Status: SHIPPED | OUTPATIENT
Start: 2024-11-22 | End: 2024-11-23

## 2025-02-05 ENCOUNTER — OFFICE VISIT (OUTPATIENT)
Dept: FAMILY MEDICINE CLINIC | Facility: MEDICAL CENTER | Age: 45
End: 2025-02-05
Payer: COMMERCIAL

## 2025-02-05 VITALS
WEIGHT: 231.6 LBS | HEIGHT: 69 IN | TEMPERATURE: 98.3 F | SYSTOLIC BLOOD PRESSURE: 122 MMHG | OXYGEN SATURATION: 98 % | BODY MASS INDEX: 34.3 KG/M2 | HEART RATE: 66 BPM | RESPIRATION RATE: 17 BRPM | DIASTOLIC BLOOD PRESSURE: 84 MMHG

## 2025-02-05 DIAGNOSIS — Z23 ENCOUNTER FOR IMMUNIZATION: ICD-10-CM

## 2025-02-05 DIAGNOSIS — Z00.00 ANNUAL PHYSICAL EXAM: Primary | ICD-10-CM

## 2025-02-05 PROCEDURE — 90656 IIV3 VACC NO PRSV 0.5 ML IM: CPT | Performed by: STUDENT IN AN ORGANIZED HEALTH CARE EDUCATION/TRAINING PROGRAM

## 2025-02-05 PROCEDURE — 99396 PREV VISIT EST AGE 40-64: CPT | Performed by: STUDENT IN AN ORGANIZED HEALTH CARE EDUCATION/TRAINING PROGRAM

## 2025-02-05 PROCEDURE — 90471 IMMUNIZATION ADMIN: CPT | Performed by: STUDENT IN AN ORGANIZED HEALTH CARE EDUCATION/TRAINING PROGRAM

## 2025-02-05 NOTE — PROGRESS NOTES
Adult Annual Physical  Name: Manisha Askew      : 1980      MRN: [id number]  Encounter Provider: Joe Alexandra DO  Encounter Date: 2025   Encounter department: [facility]    Assessment & Plan  Annual physical exam    3/30/2021 Pap smear with cotesting normal cytology and negative HPV  2024 mammogram no concerning findings  Next colonoscopy due 10/16/2029 (history of colonic polyps)       Encounter for immunization    Orders:    influenza vaccine preservative-free 0.5 mL IM (Fluzone, Afluria, Fluarix, Flulaval)    Immunizations and preventive care screenings were discussed with patient today. Appropriate education was printed on patient's after visit summary.    Counseling:  Dental Health: discussed importance of regular tooth brushing, flossing, and dental visits.  Exercise: the importance of regular exercise/physical activity was discussed. Recommend exercise 3-5 times per week for at least 30 minutes.          History of Present Illness     Adult Annual Physical:  Patient presents for annual physical.     Diet and Physical Activity:  - Diet/Nutrition: well balanced diet. she has cut down sweets  - Exercise: 1-2 times a week on average.    Depression Screening:  - PHQ-2 Score: 0    General Health:  - Sleep: 4-6 hours of sleep on average.  - Hearing: normal hearing bilateral ears.  - Vision: wears glasses and contacts and goes for regular eye exams.  - Dental: regular dental visits and brushes teeth twice daily.    /GYN Health:  - Follows with GYN: yes.   - Menopause: premenopausal.     Review of Systems    As noted in HPI   Pertinent Medical History     Medical History Reviewed by provider this encounter:  Tobacco  Allergies  Meds  Problems  Med Hx  Surg Hx  Fam Hx     .  Past Medical History   Past Medical History:   Diagnosis Date    Allergic     Anxiety     Anxiety disorder     Fx ankle     b/l    Varicella      History reviewed. No pertinent surgical  history.  Family History   Problem Relation Age of Onset    Cancer Maternal Grandmother     Diabetes Maternal Grandmother     Breast cancer Maternal Grandmother         over 50    Colon cancer Maternal Grandmother         over 50    Cancer Maternal Grandfather     Colon cancer Maternal Grandfather         over 50    Cancer Paternal Grandfather     Colon cancer Paternal Grandfather         over 50    No Known Problems Mother     No Known Problems Father     No Known Problems Sister     No Known Problems Brother     No Known Problems Maternal Aunt     No Known Problems Paternal Uncle     No Known Problems Paternal Uncle     No Known Problems Maternal Aunt     No Known Problems Maternal Aunt     Prostate cancer Maternal Uncle     Ovarian cancer Neg Hx     Cervical cancer Neg Hx       reports that she has never smoked. She has never used smokeless tobacco. She reports current alcohol use. She reports that she does not currently use drugs.  Current Outpatient Medications on File Prior to Visit   Medication Sig Dispense Refill    cholecalciferol (VITAMIN D3) 400 units tablet Take 400 Units by mouth daily      FLUoxetine (PROzac) 10 mg capsule Take 1 capsule (10 mg total) by mouth daily 30 capsule 12    valACYclovir (VALTREX) 1,000 mg tablet Take 2 tablets (2,000 mg total) by mouth 2 (two) times a day for 1 day Take 2000 mg twice a day for 1 day ASAP after symptom onset of cold sore 32 tablet 0     No current facility-administered medications on file prior to visit.   No Known Allergies   Current Outpatient Medications on File Prior to Visit   Medication Sig Dispense Refill    cholecalciferol (VITAMIN D3) 400 units tablet Take 400 Units by mouth daily      FLUoxetine (PROzac) 10 mg capsule Take 1 capsule (10 mg total) by mouth daily 30 capsule 12    valACYclovir (VALTREX) 1,000 mg tablet Take 2 tablets (2,000 mg total) by mouth 2 (two) times a day for 1 day Take 2000 mg twice a day for 1 day ASAP after symptom onset of  "cold sore 32 tablet 0     No current facility-administered medications on file prior to visit.      Social History     Tobacco Use    Smoking status: Never    Smokeless tobacco: Never   Vaping Use    Vaping status: Never Used   Substance and Sexual Activity    Alcohol use: Yes     Comment: rare use    Drug use: Not Currently    Sexual activity: Yes     Partners: Female     Birth control/protection: None       Objective   /84 (BP Location: Left arm, Patient Position: Sitting, Cuff Size: Large)   Pulse 66   Temp 98.3 °F (36.8 °C) (Temporal)   Resp 17   Ht 5' 9\" (1.753 m)   Wt 105 kg (231 lb 9.6 oz)   SpO2 98%   BMI 34.20 kg/m²     Physical Exam  Vitals reviewed.   Constitutional:       General: She is not in acute distress.     Appearance: Normal appearance.   HENT:      Head: Normocephalic and atraumatic.      Right Ear: External ear normal.      Left Ear: External ear normal.      Nose: Nose normal.      Mouth/Throat:      Mouth: Mucous membranes are moist.      Pharynx: Oropharynx is clear.   Eyes:      Extraocular Movements: Extraocular movements intact.      Conjunctiva/sclera: Conjunctivae normal.      Pupils: Pupils are equal, round, and reactive to light.   Cardiovascular:      Rate and Rhythm: Normal rate and regular rhythm.      Pulses: Normal pulses.      Heart sounds: Normal heart sounds.   Pulmonary:      Effort: Pulmonary effort is normal.      Breath sounds: Normal breath sounds.   Abdominal:      General: Abdomen is flat. Bowel sounds are normal.      Palpations: Abdomen is soft.      Tenderness: There is no abdominal tenderness.   Musculoskeletal:      Cervical back: Neck supple.      Right lower leg: No edema.      Left lower leg: No edema.   Lymphadenopathy:      Cervical: No cervical adenopathy.   Skin:     General: Skin is warm and dry.      Capillary Refill: Capillary refill takes less than 2 seconds.   Neurological:      Mental Status: She is alert and oriented to person, place, and " time.   Psychiatric:         Mood and Affect: Mood normal.         Behavior: Behavior normal.         Thought Content: Thought content normal.         Judgment: Judgment normal.

## 2025-02-05 NOTE — PATIENT INSTRUCTIONS
"Patient Education     Routine physical for adults   The Basics   Written by the doctors and editors at [facility]   What is a physical? -- A physical is a routine visit, or \"check-up,\" with your doctor. You might also hear it called a \"wellness visit\" or \"preventive visit.\"  During each visit, the doctor will:   Ask about your physical and mental health   Ask about your habits, behaviors, and lifestyle   Do an exam   Give you vaccines if needed   Talk to you about any medicines you take   Give advice about your health   Answer your questions  Getting regular check-ups is an important part of taking care of your health. It can help your doctor find and treat any problems you have. But it's also important for preventing health problems.  A routine physical is different from a \"sick visit.\" A sick visit is when you see a doctor because of a health concern or problem. Since physicals are scheduled ahead of time, you can think about what you want to ask the doctor.  How often should I get a physical? -- It depends on your age and health. In general, for people age 21 years and older:   If you are younger than 50 years, you might be able to get a physical every 3 years.   If you are 50 years or older, your doctor might recommend a physical every year.  If you have an ongoing health condition, like diabetes or high blood pressure, your doctor will probably want to see you more often.  What happens during a physical? -- In general, each visit will include:   Physical exam - The doctor or nurse will check your height, weight, heart rate, and blood pressure. They will also look at your eyes and ears. They will ask about how you are feeling and whether you have any symptoms that bother you.   Medicines - It's a good idea to bring a list of all the medicines you take to each doctor visit. Your doctor will talk to you about your medicines and answer any questions. Tell them if you are having any side effects that bother you. You " "should also tell them if you are having trouble paying for any of your medicines.   Habits and behaviors - This includes:   Your diet   Your exercise habits   Whether you smoke, drink alcohol, or use drugs   Whether you are sexually active   Whether you feel safe at home  Your doctor will talk to you about things you can do to improve your health and lower your risk of health problems. They will also offer help and support. For example, if you want to quit smoking, they can give you advice and might prescribe medicines. If you want to improve your diet or get more physical activity, they can help you with this, too.   Lab tests, if needed - The tests you get will depend on your age and situation. For example, your doctor might want to check your:   Cholesterol   Blood sugar   Iron level   Vaccines - The recommended vaccines will depend on your age, health, and what vaccines you already had. Vaccines are very important because they can prevent certain serious or deadly infections.   Discussion of screening - \"Screening\" means checking for diseases or other health problems before they cause symptoms. Your doctor can recommend screening based on your age, risk, and preferences. This might include tests to check for:   Cancer, such as breast, prostate, cervical, ovarian, colorectal, prostate, lung, or skin cancer   Sexually transmitted infections, such as chlamydia and gonorrhea   Mental health conditions like depression and anxiety  Your doctor will talk to you about the different types of screening tests. They can help you decide which screenings to have. They can also explain what the results might mean.   Answering questions - The physical is a good time to ask the doctor or nurse questions about your health. If needed, they can refer you to other doctors or specialists, too.  Adults older than 65 years often need other care, too. As you get older, your doctor will talk to you about:   How to prevent falling at " home   Hearing or vision tests   Memory testing   How to take your medicines safely   Making sure that you have the help and support you need at home  All topics are updated as new evidence becomes available and our peer review process is complete.  This topic retrieved from [employer] on: May 02, 2024.  Topic 611312 Version 1.0  Release: 32.4.3 - C32.122  © 2024 Up[employer]Date, Inc[employer] and/or its affiliates. All rights reserved.  Consumer Information Use and Disclaimer   Disclaimer: This generalized information is a limited summary of diagnosis, treatment, and/or medication information. It is not meant to be comprehensive and should be used as a tool to help the user understand and/or assess potential diagnostic and treatment options. It does NOT include all information about conditions, treatments, medications, side effects, or risks that may apply to a specific patient. It is not intended to be medical advice or a substitute for the medical advice, diagnosis, or treatment of a health care provider based on the health care provider's examination and assessment of a patient's specific and unique circumstances. Patients must speak with a health care provider for complete information about their health, medical questions, and treatment options, including any risks or benefits regarding use of medications. This information does not endorse any treatments or medications as safe, effective, or approved for treating a specific patient. Up[employer], Inc[employer] and its affiliates disclaim any warranty or liability relating to this information or the use thereof.The use of this information is governed by the Terms of Use, available at https://www.woltersBeMyGuestuwer.com/en/know/clinical-effectiveness-terms. 2024© UpToDate, Inc. and its affiliates and/or licensors. All rights reserved.  Copyright   © 2024 [employer], Inc. and/or its affiliates. All rights reserved.

## 2025-02-24 DIAGNOSIS — B00.1 RECURRENT COLD SORES: ICD-10-CM

## 2025-02-24 RX ORDER — VALACYCLOVIR HYDROCHLORIDE 1 G/1
2000 TABLET, FILM COATED ORAL 2 TIMES DAILY
Qty: 32 TABLET | Refills: 0 | Status: SHIPPED | OUTPATIENT
Start: 2025-02-24 | End: 2025-02-25

## 2025-04-18 ENCOUNTER — HOSPITAL ENCOUNTER (OUTPATIENT)
Dept: RADIOLOGY | Facility: MEDICAL CENTER | Age: 45
Discharge: HOME | End: 2025-04-18
Payer: COMMERCIAL

## 2025-04-18 VITALS — WEIGHT: 231 LBS | HEIGHT: 69 IN | BODY MASS INDEX: 34.21 KG/M2

## 2025-04-18 DIAGNOSIS — Z12.31 ENCOUNTER FOR SCREENING MAMMOGRAM FOR BREAST CANCER: ICD-10-CM

## 2025-04-18 PROCEDURE — 77067 SCR MAMMO BI INCL CAD: CPT

## 2025-04-18 PROCEDURE — 77063 BREAST TOMOSYNTHESIS BI: CPT

## 2025-04-29 ENCOUNTER — RESULTS FOLLOW-UP (OUTPATIENT)
Dept: OBGYN CLINIC | Facility: MEDICAL CENTER | Age: 45
End: 2025-04-29

## 2025-05-21 DIAGNOSIS — F32.81 PMDD (PREMENSTRUAL DYSPHORIC DISORDER): ICD-10-CM

## 2025-05-21 NOTE — TELEPHONE ENCOUNTER
The patient called to request this prescription that was refilled in the past by another provider. The patient state that  she spoke with the doctor and they told that she will be able to refill the medication.     The patient would like the medication for a year.

## 2025-05-27 RX ORDER — FLUOXETINE 10 MG/1
10 CAPSULE ORAL DAILY
Qty: 180 CAPSULE | Refills: 1 | Status: SHIPPED | OUTPATIENT
Start: 2025-05-27 | End: 2026-05-27

## 2025-06-10 DIAGNOSIS — B00.1 RECURRENT COLD SORES: ICD-10-CM

## 2025-06-11 RX ORDER — VALACYCLOVIR HYDROCHLORIDE 1 G/1
2000 TABLET, FILM COATED ORAL 2 TIMES DAILY
Qty: 32 TABLET | Refills: 0 | Status: SHIPPED | OUTPATIENT
Start: 2025-06-11 | End: 2025-06-12

## 2025-08-19 DIAGNOSIS — B00.1 RECURRENT COLD SORES: ICD-10-CM

## 2025-08-21 ENCOUNTER — TELEPHONE (OUTPATIENT)
Age: 45
End: 2025-08-21

## 2025-08-21 RX ORDER — VALACYCLOVIR HYDROCHLORIDE 1 G/1
2000 TABLET, FILM COATED ORAL 2 TIMES DAILY
Qty: 32 TABLET | Refills: 0 | Status: SHIPPED | OUTPATIENT
Start: 2025-08-21 | End: 2025-08-21